# Patient Record
Sex: FEMALE | Race: WHITE | NOT HISPANIC OR LATINO | Employment: FULL TIME | URBAN - METROPOLITAN AREA
[De-identification: names, ages, dates, MRNs, and addresses within clinical notes are randomized per-mention and may not be internally consistent; named-entity substitution may affect disease eponyms.]

---

## 2017-01-03 ENCOUNTER — TELEPHONE (OUTPATIENT)
Dept: MEDICAL GROUP | Facility: CLINIC | Age: 37
End: 2017-01-03

## 2017-01-03 DIAGNOSIS — F51.02 ADJUSTMENT INSOMNIA: ICD-10-CM

## 2017-01-03 RX ORDER — TRAZODONE HYDROCHLORIDE 50 MG/1
50 TABLET ORAL NIGHTLY PRN
Qty: 60 TAB | Refills: 3 | Status: SHIPPED | OUTPATIENT
Start: 2017-01-03 | End: 2019-03-05 | Stop reason: SDUPTHER

## 2017-01-03 NOTE — TELEPHONE ENCOUNTER
VOICEMAIL  1. Caller Name: Jennifer Cano                      Call Back Number: [phone] (home)     2. Message: pt called stating her best friend passed on awilda day and she is having trouble sleeping all she thinks about is the  and just can't sleep, will like to know if you can prescribe something to help sleep. Thank you     3. Patient approves office to leave a detailed voicemail/MyChart message: yes

## 2017-01-13 ENCOUNTER — HOSPITAL ENCOUNTER (OUTPATIENT)
Dept: LAB | Facility: MEDICAL CENTER | Age: 37
End: 2017-01-13
Attending: INTERNAL MEDICINE
Payer: COMMERCIAL

## 2017-01-13 DIAGNOSIS — E78.5 DYSLIPIDEMIA: ICD-10-CM

## 2017-01-13 LAB
ALBUMIN SERPL BCP-MCNC: 4.1 G/DL (ref 3.2–4.9)
ALP SERPL-CCNC: 69 U/L (ref 30–99)
ALT SERPL-CCNC: 20 U/L (ref 2–50)
AST SERPL-CCNC: 15 U/L (ref 12–45)
BILIRUB CONJ SERPL-MCNC: <0.1 MG/DL (ref 0.1–0.5)
BILIRUB INDIRECT SERPL-MCNC: NORMAL MG/DL (ref 0–1)
BILIRUB SERPL-MCNC: 0.6 MG/DL (ref 0.1–1.5)
PROT SERPL-MCNC: 6.7 G/DL (ref 6–8.2)

## 2017-01-13 PROCEDURE — 80076 HEPATIC FUNCTION PANEL: CPT

## 2017-01-13 PROCEDURE — 36415 COLL VENOUS BLD VENIPUNCTURE: CPT

## 2017-01-25 ENCOUNTER — OFFICE VISIT (OUTPATIENT)
Dept: URGENT CARE | Facility: CLINIC | Age: 37
End: 2017-01-25
Payer: COMMERCIAL

## 2017-01-25 VITALS
RESPIRATION RATE: 16 BRPM | HEIGHT: 64 IN | BODY MASS INDEX: 36.54 KG/M2 | WEIGHT: 214 LBS | OXYGEN SATURATION: 98 % | TEMPERATURE: 98.7 F | DIASTOLIC BLOOD PRESSURE: 68 MMHG | SYSTOLIC BLOOD PRESSURE: 130 MMHG | HEART RATE: 71 BPM

## 2017-01-25 DIAGNOSIS — K64.5 THROMBOSED EXTERNAL HEMORRHOID: ICD-10-CM

## 2017-01-25 NOTE — MR AVS SNAPSHOT
"Jennifer Cano   2017 5:30 PM   Office Visit   MRN: [id number]    Department:  [facility]   Dept Phone:  [phone]    Description:  Female : 1980   Provider:  MAKENZIE Emmanuel M.D.           Reason for Visit     Hemorrhoids on anus x 4 days      Allergies as of 2017     Allergen Noted Reactions    Neomycin-Polymyxin B 2009   Rash      Vital Signs     Blood Pressure Pulse Temperature Respirations Height Weight    130/68 mmHg 71 37.1 °C (98.7 °F) 16 1.626 m (5' 4.02\") 97.07 kg (214 lb)    Body Mass Index Oxygen Saturation Breastfeeding? Smoking Status          36.72 kg/m2 98% No Never Smoker         Basic Information     Date Of Birth Sex Race Ethnicity Preferred Language    1980 Female White Non- English      Your appointments     Feb 10, 2017  3:20 PM   Established Patient with Jacob Ya D.O.   [address]  [facility] [id number] [phone]           You will be receiving a confirmation call a few days before your appointment from our automated call confirmation system.              Problem List              ICD-10-CM Priority Class Noted - Resolved    Migraines, neuralgic (Chronic) G44.009   2009 - Present    Asthma, well controlled (Chronic) J45.909   2009 - Present    Osteopenia M85.80   2016 - Present      Health Maintenance        Date Due Completion Dates    IMM DTaP/Tdap/Td Vaccine (1 - Tdap) 1999 ---    IMM PNEUMOCOCCAL 19-64 (ADULT) MEDIUM RISK SERIES (1 of 1 - PPSV23) 1999 ---    PAP SMEAR 2001 ---            Current Immunizations     Influenza TIV (IM) 10/15/2015    Influenza Vaccine Quad Inj (Pf) 10/15/2016 10:07 AM      Below and/or attached are the medications your provider expects you to take. Review all of your home medications and newly ordered medications with your provider and/or pharmacist. Follow medication instructions as directed by your provider and/or " pharmacist. Please keep your medication list with you and share with your provider. Update the information when medications are discontinued, doses are changed, or new medications (including over-the-counter products) are added; and carry medication information at all times in the event of emergency situations     Allergies:  NEOMYCIN-POLYMYXIN B - Rash               Medications  Valid as of: January 25, 2017 -  6:56 PM    Generic Name Brand Name Tablet Size Instructions for use    Butalbital-APAP-Caffeine   Take  by mouth as needed.        Glucosamine Sulfate (Cap) glucosamine Sulfate 500 MG Take 500 mg by mouth 3 times a day, with meals.        Ibuprofen (Tab) MOTRIN 600 MG Take 800 mg by mouth every 6 hours as needed.        Magnesium Gluconate (Tab) MAG-G 500 MG Take 500 mg by mouth 3 times a day.        Multiple Minerals-Vitamins (Tab) CALCIUM CITRATE PLUS  Take  by mouth.        Multiple Vitamins-Minerals   Take  by mouth.        Pravastatin Sodium (Tab) PRAVACHOL 10 MG Take 1 Tab by mouth every day.        TraZODone HCl (Tab) DESYREL 50 MG Take 1 Tab by mouth at bedtime as needed for Sleep.        .                 Medicines prescribed today were sent to:     [facility] #[id number] - [address] AT [address] [address]    Phone: [phone] Fax: [phone]    Open 24 Hours?: No      Medication refill instructions:       If your prescription bottle indicates you have medication refills left, it is not necessary to call your provider’s office. Please contact your pharmacy and they will refill your medication.    If your prescription bottle indicates you do not have any refills left, you may request refills at any time through one of the following ways: The online Nettle system (except Urgent Care), by calling your provider’s office, or by asking your pharmacy to contact your provider’s office with a refill request. Medication refills are processed only during regular  business hours and may not be available until the next business day. Your provider may request additional information or to have a follow-up visit with you prior to refilling your medication.   *Please Note: Medication refills are assigned a new Rx number when refilled electronically. Your pharmacy may indicate that no refills were authorized even though a new prescription for the same medication is available at the pharmacy. Please request the medicine by name with the pharmacy before contacting your provider for a refill.           Flavourly Access Code: [email or web address]  Expires: 2/13/2017  9:34 AM    Flavourly  A secure, online tool to manage your health information     [employer]’s Flavourly® is a secure, online tool that connects you to your personalized health information from the privacy of your home -- day or night - making it very easy for you to manage your healthcare. Once the activation process is completed, you can even access your medical information using the Flavourly tracy, which is available for free in the Apple Tracy store or Google Play store.     Flavourly provides the following levels of access (as shown below):   My Chart Features   [facility] Doctor [facility]  Specialists [facility]  Urgent  Care Non-[facility]  Primary Care  Doctor   Email your healthcare team securely and privately 24/7 X X X    Manage appointments: schedule your next appointment; view details of past/upcoming appointments X      Request prescription refills. X      View recent personal medical records, including lab and immunizations X X X X   View health record, including health history, allergies, medications X X X X   Read reports about your outpatient visits, procedures, consult and ER notes X X X X   See your discharge summary, which is a recap of your hospital and/or ER visit that includes your diagnosis, lab results, and care plan. X X       How to register for Flavourly:  1. Go to  [email or web address].  2. Click on the  Sign Up Now box, which takes you to the New Member Sign Up page. You will need to provide the following information:  a. Enter your Samba Ads Access Code exactly as it appears at the top of this page. (You will not need to use this code after you’ve completed the sign-up process. If you do not sign up before the expiration date, you must request a new code.)   b. Enter your date of birth.   c. Enter your home email address.   d. Click Submit, and follow the next screen’s instructions.  3. Create a Samba Ads ID. This will be your Samba Ads login ID and cannot be changed, so think of one that is secure and easy to remember.  4. Create a Samba Ads password. You can change your password at any time.  5. Enter your Password Reset Question and Answer. This can be used at a later time if you forget your password.   6. Enter your e-mail address. This allows you to receive e-mail notifications when new information is available in Samba Ads.  7. Click Sign Up. You can now view your health information.    For assistance activating your Samba Ads account, call [phone]

## 2017-01-27 ASSESSMENT — ENCOUNTER SYMPTOMS
NERVOUS/ANXIOUS: 0
CONSTIPATION: 0
BLOOD IN STOOL: 0
VOMITING: 0
FEVER: 0
CHILLS: 0
ABDOMINAL PAIN: 0
HEADACHES: 0
NAUSEA: 0

## 2017-01-27 NOTE — PROGRESS NOTES
"Subjective:      Jennifer Cano is a 36 y.o. female who presents with Hemorrhoids            Hemorrhoids  This is a new problem. The current episode started yesterday. The problem has been unchanged. Pertinent negatives include no abdominal pain, chest pain, chills, fever, headaches, nausea or vomiting.       Review of Systems   Constitutional: Negative for fever and chills.   Cardiovascular: Negative for chest pain.   Gastrointestinal: Positive for hemorrhoids. Negative for nausea, vomiting, abdominal pain, constipation and blood in stool.   Skin:        Mass at 6 o'clock on her rectum   Neurological: Negative for headaches.   Psychiatric/Behavioral: The patient is not nervous/anxious.           Objective:     /68 mmHg  Pulse 71  Temp(Src) 37.1 °C (98.7 °F)  Resp 16  Ht 1.626 m (5' 4.02\")  Wt 97.07 kg (214 lb)  BMI 36.72 kg/m2  SpO2 98%  Breastfeeding? No     Physical Exam   Constitutional: She appears well-developed and well-nourished. No distress.   HENT:   Head: Normocephalic and atraumatic.   Eyes: Conjunctivae are normal.   Cardiovascular: Normal rate.    Pulmonary/Chest: Effort normal.   Genitourinary:   5mm thrombosed hemorrhoid at 6 oclock   Vitals reviewed.            Procedure: I explained the surgery to the pt and she agreed, The mass was cleaned with betadine, blocked with xylocaine 1 5 without, The mass was incized with an 11 scapel and clot was removed and shown to the pt, little bleeding, polysporin and pad applied,    Assessment/Plan:     DX: Thrombosed external hemorrhoid / incized       Pt will perform sitz bathes was for infection apply ointment      "

## 2017-02-11 ENCOUNTER — HOSPITAL ENCOUNTER (OUTPATIENT)
Dept: LAB | Facility: MEDICAL CENTER | Age: 37
End: 2017-02-11
Attending: INTERNAL MEDICINE
Payer: COMMERCIAL

## 2017-05-09 ENCOUNTER — TELEPHONE (OUTPATIENT)
Dept: MEDICAL GROUP | Facility: CLINIC | Age: 37
End: 2017-05-09

## 2017-05-09 DIAGNOSIS — M72.2 PLANTAR FASCIITIS: ICD-10-CM

## 2017-05-09 NOTE — TELEPHONE ENCOUNTER
1. Caller Name: Jennifer Cano                    Call Back Number: [phone] (home)         Patient approves a detailed voicemail message: yes    2. SPECIFIC Action To Be Taken: Patient needs referral to physical therapy for foot pain (plantars factitias) in both feet     3. Diagnosis/Clinical Reason for Request: persistent foot pain     4. Specialty & Provider Name/Lab/Imaging Location: patient requesting to be seen at [facility] or [facility].    5. Is appointment scheduled for requested order/referral: no

## 2017-05-19 ENCOUNTER — OFFICE VISIT (OUTPATIENT)
Dept: MEDICAL GROUP | Facility: CLINIC | Age: 37
End: 2017-05-19
Payer: COMMERCIAL

## 2017-05-19 VITALS
TEMPERATURE: 97.8 F | RESPIRATION RATE: 16 BRPM | HEIGHT: 64 IN | HEART RATE: 84 BPM | DIASTOLIC BLOOD PRESSURE: 60 MMHG | BODY MASS INDEX: 36.19 KG/M2 | WEIGHT: 212 LBS | SYSTOLIC BLOOD PRESSURE: 118 MMHG | OXYGEN SATURATION: 96 %

## 2017-05-19 DIAGNOSIS — J01.00 ACUTE NON-RECURRENT MAXILLARY SINUSITIS: ICD-10-CM

## 2017-05-19 PROCEDURE — 99213 OFFICE O/P EST LOW 20 MIN: CPT | Performed by: NURSE PRACTITIONER

## 2017-05-19 RX ORDER — PSEUDOEPHEDRINE HCL 30 MG
60 TABLET ORAL EVERY 4 HOURS PRN
COMMUNITY
End: 2017-05-19

## 2017-05-19 RX ORDER — LORATADINE 10 MG/1
10 TABLET ORAL DAILY
COMMUNITY
End: 2018-03-06

## 2017-05-19 RX ORDER — AMOXICILLIN AND CLAVULANATE POTASSIUM 875; 125 MG/1; MG/1
1 TABLET, FILM COATED ORAL 2 TIMES DAILY
Qty: 20 TAB | Refills: 0 | Status: SHIPPED | OUTPATIENT
Start: 2017-05-19 | End: 2017-05-29

## 2017-05-19 ASSESSMENT — ENCOUNTER SYMPTOMS
COUGH: 1
SORE THROAT: 1
SHORTNESS OF BREATH: 0
WHEEZING: 0
FEVER: 0
MYALGIAS: 0
SPUTUM PRODUCTION: 1
CHILLS: 0

## 2017-05-19 ASSESSMENT — PATIENT HEALTH QUESTIONNAIRE - PHQ9: CLINICAL INTERPRETATION OF PHQ2 SCORE: 2

## 2017-05-19 NOTE — PROGRESS NOTES
Chief Complaint   Patient presents with   • URI     sinus congestion/ chest congestion/ productive cough/ ear tingling/ x 1 weeks        HISTORY OF PRESENT ILLNESS: Patient is a 37 y.o. female established patient who presents today due to a week of productive coughing, chest congestion and sinus congestion with yellowish discharge. Pt reports that she has tried several different OTC medications for decongestion and coughing. She also uses netipot to clean her nose to help her breath. She thought her symptoms are allergy related so that she has been waiting but her symptoms seem not to improve at all. She now starts feeling ear ache and worsening congestion in the past two days. Feeling cold but not chills. No fever.       Patient Active Problem List    Diagnosis Date Noted   • Osteopenia 02/08/2016   • Migraines, neuralgic 06/01/2009   • Asthma, well controlled 06/01/2009       Allergies:Neomycin-polymyxin b    Current Outpatient Prescriptions   Medication Sig Dispense Refill   • pseudoephedrine (SUDAFED) 30 MG Tab Take 60 mg by mouth every four hours as needed for Congestion.     • loratadine (CLARITIN) 10 MG Tab Take 10 mg by mouth every day.     • cholecalciferol (VITAMIN D3) 400 UNIT Tab Take 400 Units by mouth every day.     • trazodone (DESYREL) 50 MG Tab Take 1 Tab by mouth at bedtime as needed for Sleep. 60 Tab 3   • Multiple Minerals-Vitamins (CALCIUM CITRATE PLUS) Tab Take  by mouth.     • Multiple Vitamins-Minerals (MULTIVITAMIN & MINERAL PO) Take  by mouth.     • glucosamine Sulfate 500 MG Cap Take 500 mg by mouth 3 times a day, with meals.     • magnesium gluconate (MAG-G) 500 MG tablet Take 500 mg by mouth 3 times a day.     • ibuprofen (MOTRIN) 600 MG Tab Take 800 mg by mouth every 6 hours as needed.     • FIORICET PO Take  by mouth as needed.     • pravastatin (PRAVACHOL) 10 MG Tab Take 1 Tab by mouth every day. 30 Tab 11     No current facility-administered medications for this visit.       Social  "History   Substance Use Topics   • Smoking status: Never Smoker    • Smokeless tobacco: Never Used   • Alcohol Use: No       No family status information on file.     Family History   Problem Relation Age of Onset   • Hypertension Mother          ROS:  Review of Systems   Constitutional: Negative for fever and chills.   HENT: Positive for congestion and sore throat. Negative for ear pain ( itchiness and uncomfortable).    Respiratory: Positive for cough and sputum production. Negative for shortness of breath and wheezing.    Musculoskeletal: Negative for myalgias.        Objective:     Blood pressure 118/60, pulse 84, temperature 36.6 °C (97.8 °F), resp. rate 16, height 1.626 m (5' 4\"), weight 96.163 kg (212 lb), SpO2 96 %.     Physical Exam:  Physical Exam   Constitutional: She is well-developed, well-nourished, and in no distress.   HENT:   Head: Normocephalic and atraumatic.   Right Ear: Hearing, tympanic membrane and external ear normal.   Left Ear: Tympanic membrane is bulging.   Nose: Right sinus exhibits maxillary sinus tenderness. Right sinus exhibits no frontal sinus tenderness. Left sinus exhibits no maxillary sinus tenderness and no frontal sinus tenderness ( mild pressure).   Mouth/Throat: No oropharyngeal exudate, posterior oropharyngeal edema, posterior oropharyngeal erythema or tonsillar abscesses.   Vitals reviewed.        Assessment/Plan:  1. Acute non-recurrent maxillary sinusitis  - amoxicillin-clavulanate (AUGMENTIN) 875-125 MG Tab; Take 1 Tab by mouth 2 times a day for 10 days.  Dispense: 20 Tab; Refill: 0  - Chlorpheniramine-Phenylephrine (SUDAFED PE SINUS/ALLERGY) 4-10 MG Tab; Take 1 Tab by mouth every 6 hours as needed.  Dispense: 30 Tab; Refill: 0 per pt's request  - Instructed pt to read the label for other OTC medications to make sure she is not taking same medications at the same time. She verbalized understanding.     Differential diagnoses and indications for immediate follow-up " discussed with patient.    Instructed to return to clinic or nearest emergency department for any change in condition, further concerns, or worsening of symptoms.    The patient demonstrated a good understanding and agreed with the treatment plan.

## 2017-05-19 NOTE — MR AVS SNAPSHOT
"Jennifer Cano   2017 11:20 AM   Office Visit   MRN: [id number]    Department:  [facility]   Dept Phone:  [phone]    Description:  Female : 1980   Provider:  LJ Rodriguez           Reason for Visit     URI sinus congestion/ chest congestion/ productive cough/ ear tingling/ x 1 weeks       Allergies as of 2017     Allergen Noted Reactions    Neomycin-Polymyxin B 2009   Rash      You were diagnosed with     Acute non-recurrent maxillary sinusitis   [[id number]]         Vital Signs     Blood Pressure Pulse Temperature Respirations Height Weight    118/60 mmHg 84 36.6 °C (97.8 °F) 16 1.626 m (5' 4\") 96.163 kg (212 lb)    Body Mass Index Oxygen Saturation Smoking Status             36.37 kg/m2 96% Never Smoker          Basic Information     Date Of Birth Sex Race Ethnicity Preferred Language    1980 Female White Non- English      Problem List              ICD-10-CM Priority Class Noted - Resolved    Migraines, neuralgic (Chronic) G44.009   2009 - Present    Asthma, well controlled (Chronic) J45.909   2009 - Present    Osteopenia M85.80   2016 - Present      Health Maintenance        Date Due Completion Dates    IMM DTaP/Tdap/Td Vaccine (1 - Tdap) 1999 ---    IMM PNEUMOCOCCAL 19-64 (ADULT) MEDIUM RISK SERIES (1 of 1 - PPSV23) 1999 ---    PAP SMEAR 2001 ---            Current Immunizations     Influenza TIV (IM) 10/15/2015    Influenza Vaccine Quad Inj (Pf) 10/15/2016 10:07 AM      Below and/or attached are the medications your provider expects you to take. Review all of your home medications and newly ordered medications with your provider and/or pharmacist. Follow medication instructions as directed by your provider and/or pharmacist. Please keep your medication list with you and share with your provider. Update the information when medications are discontinued, doses are changed, or new medications (including over-the-counter " products) are added; and carry medication information at all times in the event of emergency situations     Allergies:  NEOMYCIN-POLYMYXIN B - Rash               Medications  Valid as of: May 19, 2017 - 12:56 PM    Generic Name Brand Name Tablet Size Instructions for use    Amoxicillin-Pot Clavulanate (Tab) AUGMENTIN 875-125 MG Take 1 Tab by mouth 2 times a day for 10 days.        Butalbital-APAP-Caffeine   Take  by mouth as needed.        Chlorpheniramine-Phenylephrine (Tab) Chlorpheniramine-Phenylephrine 4-10 MG Take 1 Tab by mouth every 6 hours as needed.        Cholecalciferol (Tab) VITAMIN D3 400 UNIT Take 400 Units by mouth every day.        Glucosamine Sulfate (Cap) glucosamine Sulfate 500 MG Take 500 mg by mouth 3 times a day, with meals.        Ibuprofen (Tab) MOTRIN 600 MG Take 800 mg by mouth every 6 hours as needed.        Loratadine (Tab) CLARITIN 10 MG Take 10 mg by mouth every day.        Magnesium Gluconate (Tab) MAG-G 500 MG Take 500 mg by mouth 3 times a day.        Multiple Minerals-Vitamins (Tab) CALCIUM CITRATE PLUS  Take  by mouth.        Multiple Vitamins-Minerals   Take  by mouth.        Pravastatin Sodium (Tab) PRAVACHOL 10 MG Take 1 Tab by mouth every day.        TraZODone HCl (Tab) DESYREL 50 MG Take 1 Tab by mouth at bedtime as needed for Sleep.        .                 Medicines prescribed today were sent to:     [id number] - [place], [place] - [address] [address]    Phone: [phone] Fax: [phone]    Open 24 Hours?: No      Medication refill instructions:       If your prescription bottle indicates you have medication refills left, it is not necessary to call your provider’s office. Please contact your pharmacy and they will refill your medication.    If your prescription bottle indicates you do not have any refills left, you may request refills at any time through one of the following ways: The online Trips n Salsa system (except Urgent Care),  by calling your provider’s office, or by asking your pharmacy to contact your provider’s office with a refill request. Medication refills are processed only during regular business hours and may not be available until the next business day. Your provider may request additional information or to have a follow-up visit with you prior to refilling your medication.   *Please Note: Medication refills are assigned a new Rx number when refilled electronically. Your pharmacy may indicate that no refills were authorized even though a new prescription for the same medication is available at the pharmacy. Please request the medicine by name with the pharmacy before contacting your provider for a refill.           Tango Card Access Code: [email or web address]  Expires: 6/18/2017  9:22 AM    Tango Card  A secure, online tool to manage your health information     [employer]’s Tango Card® is a secure, online tool that connects you to your personalized health information from the privacy of your home -- day or night - making it very easy for you to manage your healthcare. Once the activation process is completed, you can even access your medical information using the Tango Card tracy, which is available for free in the Apple Tracy store or Google Play store.     Tango Card provides the following levels of access (as shown below):   My Chart Features   [facility] Primary [facility] Doctor [facility]  Specialists [facility]  Urgent  Care Non-[facility]  Primary Care  Doctor   Email your healthcare team securely and privately 24/7 X X X    Manage appointments: schedule your next appointment; view details of past/upcoming appointments X      Request prescription refills. X      View recent personal medical records, including lab and immunizations X X X X   View health record, including health history, allergies, medications X X X X   Read reports about your outpatient visits, procedures, consult and ER notes X X X X   See your discharge summary, which is a recap of your  hospital and/or ER visit that includes your diagnosis, lab results, and care plan. X X       How to register for LocalCustomer:  1. Go to  [email or web address].  2. Click on the Sign Up Now box, which takes you to the New Member Sign Up page. You will need to provide the following information:  a. Enter your LocalCustomer Access Code exactly as it appears at the top of this page. (You will not need to use this code after you’ve completed the sign-up process. If you do not sign up before the expiration date, you must request a new code.)   b. Enter your date of birth.   c. Enter your home email address.   d. Click Submit, and follow the next screen’s instructions.  3. Create a Mobi Ridert ID. This will be your LocalCustomer login ID and cannot be changed, so think of one that is secure and easy to remember.  4. Create a Mobi Ridert password. You can change your password at any time.  5. Enter your Password Reset Question and Answer. This can be used at a later time if you forget your password.   6. Enter your e-mail address. This allows you to receive e-mail notifications when new information is available in LocalCustomer.  7. Click Sign Up. You can now view your health information.    For assistance activating your LocalCustomer account, call [phone]

## 2017-07-10 ENCOUNTER — HOSPITAL ENCOUNTER (OUTPATIENT)
Dept: RADIOLOGY | Facility: MEDICAL CENTER | Age: 37
End: 2017-07-10
Attending: OBSTETRICS & GYNECOLOGY

## 2017-07-14 ENCOUNTER — HOSPITAL ENCOUNTER (OUTPATIENT)
Dept: RADIOLOGY | Facility: MEDICAL CENTER | Age: 37
End: 2017-07-14
Attending: OBSTETRICS & GYNECOLOGY
Payer: COMMERCIAL

## 2017-07-14 DIAGNOSIS — N64.4 PAIN IN BREAST: ICD-10-CM

## 2017-07-14 PROCEDURE — 76642 ULTRASOUND BREAST LIMITED: CPT | Mod: LT

## 2017-07-14 PROCEDURE — G0204 DX MAMMO INCL CAD BI: HCPCS

## 2017-08-14 ENCOUNTER — HOSPITAL ENCOUNTER (EMERGENCY)
Facility: MEDICAL CENTER | Age: 37
End: 2017-08-14
Payer: COMMERCIAL

## 2017-08-14 VITALS
HEIGHT: 64 IN | TEMPERATURE: 97.9 F | BODY MASS INDEX: 36.89 KG/M2 | SYSTOLIC BLOOD PRESSURE: 120 MMHG | WEIGHT: 216.05 LBS | DIASTOLIC BLOOD PRESSURE: 60 MMHG | RESPIRATION RATE: 16 BRPM | HEART RATE: 90 BPM | OXYGEN SATURATION: 97 %

## 2017-08-14 PROCEDURE — 302449 STATCHG TRIAGE ONLY (STATISTIC)

## 2017-08-14 ASSESSMENT — PAIN SCALES - GENERAL: PAINLEVEL_OUTOF10: 5

## 2017-08-15 NOTE — ED NOTES
Pt amb to triage.  Chief Complaint   Patient presents with   • Headache     onset today while at work   • Neck Pain   • Dizziness

## 2017-09-16 ENCOUNTER — HOSPITAL ENCOUNTER (OUTPATIENT)
Facility: MEDICAL CENTER | Age: 37
End: 2017-09-16
Payer: COMMERCIAL

## 2017-09-16 LAB
ALBUMIN SERPL BCP-MCNC: 4.1 G/DL (ref 3.2–4.9)
ALBUMIN/GLOB SERPL: 1.3 G/DL
ALP SERPL-CCNC: 74 U/L (ref 30–99)
ALT SERPL-CCNC: 18 U/L (ref 2–50)
ANION GAP SERPL CALC-SCNC: 8 MMOL/L (ref 0–11.9)
AST SERPL-CCNC: 17 U/L (ref 12–45)
BDY FAT % MEASURED: 39.9 %
BILIRUB SERPL-MCNC: 0.8 MG/DL (ref 0.1–1.5)
BP DIAS: 72 MMHG
BP SYS: 120 MMHG
BUN SERPL-MCNC: 11 MG/DL (ref 8–22)
CALCIUM SERPL-MCNC: 9.3 MG/DL (ref 8.5–10.5)
CHLORIDE SERPL-SCNC: 106 MMOL/L (ref 96–112)
CHOLEST SERPL-MCNC: 197 MG/DL (ref 100–199)
CO2 SERPL-SCNC: 25 MMOL/L (ref 20–33)
CREAT SERPL-MCNC: 0.61 MG/DL (ref 0.5–1.4)
DIABETES HTDIA: NO
ERYTHROCYTE [DISTWIDTH] IN BLOOD BY AUTOMATED COUNT: 39.4 FL (ref 35.9–50)
EVENT NAME HTEVT: NORMAL
GFR SERPL CREATININE-BSD FRML MDRD: >60 ML/MIN/1.73 M 2
GLOBULIN SER CALC-MCNC: 3.1 G/DL (ref 1.9–3.5)
GLUCOSE SERPL-MCNC: 94 MG/DL (ref 65–99)
HCT VFR BLD AUTO: 41.7 % (ref 37–47)
HDLC SERPL-MCNC: 42 MG/DL
HGB BLD-MCNC: 13.9 G/DL (ref 12–16)
HYPERTENSION HTHYP: NO
LDLC SERPL CALC-MCNC: 137 MG/DL
MCH RBC QN AUTO: 29.7 PG (ref 27–33)
MCHC RBC AUTO-ENTMCNC: 33.3 G/DL (ref 33.6–35)
MCV RBC AUTO: 89.1 FL (ref 81.4–97.8)
PLATELET # BLD AUTO: 244 K/UL (ref 164–446)
PMV BLD AUTO: 10.1 FL (ref 9–12.9)
POTASSIUM SERPL-SCNC: 3.8 MMOL/L (ref 3.6–5.5)
PROT SERPL-MCNC: 7.2 G/DL (ref 6–8.2)
RBC # BLD AUTO: 4.68 M/UL (ref 4.2–5.4)
SCREENING LOC CITY HTCIT: NORMAL
SCREENING LOC STATE HTSTA: NORMAL
SCREENING LOCATION HTLOC: NORMAL
SODIUM SERPL-SCNC: 139 MMOL/L (ref 135–145)
SUBSCRIBER ID HTSID: NORMAL
TRIGL SERPL-MCNC: 91 MG/DL (ref 0–149)
WBC # BLD AUTO: 7.7 K/UL (ref 4.8–10.8)

## 2018-01-05 ENCOUNTER — OFFICE VISIT (OUTPATIENT)
Dept: URGENT CARE | Facility: CLINIC | Age: 38
End: 2018-01-05
Payer: COMMERCIAL

## 2018-01-05 VITALS
RESPIRATION RATE: 16 BRPM | HEART RATE: 98 BPM | WEIGHT: 212 LBS | SYSTOLIC BLOOD PRESSURE: 124 MMHG | HEIGHT: 64 IN | OXYGEN SATURATION: 98 % | BODY MASS INDEX: 36.19 KG/M2 | DIASTOLIC BLOOD PRESSURE: 82 MMHG | TEMPERATURE: 97.8 F

## 2018-01-05 DIAGNOSIS — J01.40 ACUTE NON-RECURRENT PANSINUSITIS: ICD-10-CM

## 2018-01-05 DIAGNOSIS — E66.9 OBESITY (BMI 35.0-39.9 WITHOUT COMORBIDITY): ICD-10-CM

## 2018-01-05 DIAGNOSIS — H66.003 ACUTE SUPPURATIVE OTITIS MEDIA OF BOTH EARS WITHOUT SPONTANEOUS RUPTURE OF TYMPANIC MEMBRANES, RECURRENCE NOT SPECIFIED: Primary | ICD-10-CM

## 2018-01-05 DIAGNOSIS — J06.9 URI WITH COUGH AND CONGESTION: ICD-10-CM

## 2018-01-05 DIAGNOSIS — J40 BRONCHITIS: ICD-10-CM

## 2018-01-05 PROCEDURE — 99214 OFFICE O/P EST MOD 30 MIN: CPT | Performed by: PHYSICIAN ASSISTANT

## 2018-01-05 RX ORDER — METHYLPREDNISOLONE 4 MG/1
TABLET ORAL
Qty: 21 TAB | Refills: 0 | Status: SHIPPED | OUTPATIENT
Start: 2018-01-05 | End: 2018-03-06

## 2018-01-05 RX ORDER — AMOXICILLIN AND CLAVULANATE POTASSIUM 875; 125 MG/1; MG/1
1 TABLET, FILM COATED ORAL 2 TIMES DAILY
Qty: 20 TAB | Refills: 0 | Status: SHIPPED | OUTPATIENT
Start: 2018-01-05 | End: 2018-01-15

## 2018-01-05 RX ORDER — PROMETHAZINE HYDROCHLORIDE AND CODEINE PHOSPHATE 6.25; 1 MG/5ML; MG/5ML
5 SYRUP ORAL 4 TIMES DAILY PRN
Qty: 240 ML | Refills: 0 | Status: SHIPPED | OUTPATIENT
Start: 2018-01-05 | End: 2018-01-19

## 2018-01-06 PROBLEM — E66.9 OBESITY (BMI 35.0-39.9 WITHOUT COMORBIDITY): Status: ACTIVE | Noted: 2018-01-06

## 2018-01-06 NOTE — PROGRESS NOTES
Subjective:      Pt is a 37 y.o. female who presents with Sinus Problem (cough, congestion X 2 weeks )            HPI  PT presents to  clinic today complaining of sore throat, pressure in ears, cough, fatigue, runny nose, post nasal drip, sinus pressure and headache. PT denies CP, SOB, NVD, abdominal pain, joint pain. PT states these symptoms began around 2 weeks ago. PT states the pain is a 5/10 with sinus pressure, aching in nature and worse at night.  Pt has not taken any RX medications for this condition. The pt's medication list, problem list, and allergies have been evaluated and reviewed during today's visit.    PMH:  Past Medical History:   Diagnosis Date   • ASTHMA 6/1/2009   • Migraines, neuralgic 6/1/2009   • Osteopenia 2/8/2016       PSH:  Past Surgical History:   Procedure Laterality Date   • PRIMARY C SECTION         Fam Hx:    family history includes Hypertension in her mother.  Family Status   Relation Status   • Mother        Soc HX:  Social History     Social History   • Marital status:      Spouse name: N/A   • Number of children: N/A   • Years of education: N/A     Occupational History   • Not on file.     Social History Main Topics   • Smoking status: Never Smoker   • Smokeless tobacco: Never Used   • Alcohol use Yes   • Drug use:      Types: Marijuana      Comment: prn   • Sexual activity: Not Currently     Other Topics Concern   • Not on file     Social History Narrative   • No narrative on file         Medications:    Current Outpatient Prescriptions:   •  amoxicillin-clavulanate (AUGMENTIN) 875-125 MG Tab, Take 1 Tab by mouth 2 times a day for 10 days., Disp: 20 Tab, Rfl: 0  •  promethazine-codeine (PHENERGAN-CODEINE) 6.25-10 MG/5ML Syrup, Take 5 mL by mouth 4 times a day as needed for up to 14 days., Disp: 240 mL, Rfl: 0  •  MethylPREDNISolone (MEDROL DOSEPAK) 4 MG Tablet Therapy Pack, Use as directed, Disp: 21 Tab, Rfl: 0  •  Multiple Vitamins-Minerals (MULTIVITAMIN & MINERAL PO),  Take  by mouth., Disp: , Rfl:   •  loratadine (CLARITIN) 10 MG Tab, Take 10 mg by mouth every day., Disp: , Rfl:   •  cholecalciferol (VITAMIN D3) 400 UNIT Tab, Take 400 Units by mouth every day., Disp: , Rfl:   •  Chlorpheniramine-Phenylephrine (SUDAFED PE SINUS/ALLERGY) 4-10 MG Tab, Take 1 Tab by mouth every 6 hours as needed., Disp: 30 Tab, Rfl: 0  •  trazodone (DESYREL) 50 MG Tab, Take 1 Tab by mouth at bedtime as needed for Sleep., Disp: 60 Tab, Rfl: 3  •  pravastatin (PRAVACHOL) 10 MG Tab, Take 1 Tab by mouth every day., Disp: 30 Tab, Rfl: 11  •  Multiple Minerals-Vitamins (CALCIUM CITRATE PLUS) Tab, Take  by mouth., Disp: , Rfl:   •  glucosamine Sulfate 500 MG Cap, Take 500 mg by mouth 3 times a day, with meals., Disp: , Rfl:   •  magnesium gluconate (MAG-G) 500 MG tablet, Take 500 mg by mouth 3 times a day., Disp: , Rfl:   •  ibuprofen (MOTRIN) 600 MG Tab, Take 800 mg by mouth every 6 hours as needed., Disp: , Rfl:   •  FIORICET PO, Take  by mouth as needed., Disp: , Rfl:       Allergies:  Neomycin-polymyxin b    ROS  Review of Systems   Constitutional: Positive for malaise/fatigue. Negative for fever and diaphoresis.   HENT: Positive for congestion, B/L ear pain and sore throat. Negative for ear discharge, hearing loss, nosebleeds and tinnitus.    Eyes: Negative for blurred vision, double vision and photophobia.   Respiratory: Positive for cough, sputum production, shortness of breath and wheezing. Negative for hemoptysis.    Cardiovascular: Negative for chest pain and palpitations.   Gastrointestinal: Negative for nausea, vomiting, abdominal pain, diarrhea and constipation.   Genitourinary: Negative for dysuria and flank pain.   Musculoskeletal: Negative for joint pain and myalgias.   Skin: Negative for itching and rash.   Neurological: Positive for sinus headaches. Negative for dizziness, tingling and weakness.   Endo/Heme/Allergies: Does not bruise/bleed easily.   Psychiatric/Behavioral: Negative for  "depression. The patient is not nervous/anxious.             Objective:     /82   Pulse 98   Temp 36.6 °C (97.8 °F)   Resp 16   Ht 1.626 m (5' 4\")   Wt 96.2 kg (212 lb)   SpO2 98%   BMI 36.39 kg/m²      Physical Exam       Physical Exam   Constitutional: PT is oriented to person, place, and time. PT appears well-developed and well-nourished. No distress.   HENT:   Head: Normocephalic and atraumatic.   Right Ear: Hearing, external ear and ear canal normal. Tympanic membrane is erythematous and bulging. A middle ear effusion is present.   Left Ear: Hearing, external ear and ear canal normal. Tympanic membrane is erythematous and bulging. A middle ear effusion is present.   Nose: Mucosal edema, rhinorrhea and sinus tenderness present. Right sinus exhibits frontal sinus tenderness. Left sinus exhibits frontal sinus tenderness.   Mouth/Throat: Uvula is midline. Mucous membranes are pale. Posterior oropharyngeal edema and posterior oropharyngeal erythema present. No oropharyngeal exudate.   Eyes: Conjunctivae normal and EOM are normal. Pupils are equal, round, and reactive to light. Right eye exhibits no discharge. Left eye exhibits no discharge.   Neck: Normal range of motion. Neck supple. No thyromegaly present.   Cardiovascular: Normal rate, regular rhythm, normal heart sounds and intact distal pulses.  Exam reveals no gallop and no friction rub.    No murmur heard.  Pulmonary/Chest: Effort normal. No respiratory distress. PT has wheezes. PT has no rales. PT exhibits tenderness.   Abdominal: Soft. Bowel sounds are normal. PT exhibits no distension and no mass. There is no tenderness. There is no rebound and no guarding.   Musculoskeletal: Normal range of motion. PT exhibits no edema and no tenderness.   Lymphadenopathy:     PT has no cervical adenopathy.   Neurological: Pt is alert and oriented to person, place, and time. Pt has normal reflexes. No cranial nerve deficit.   Skin: Skin is warm and dry. No " rash noted. No erythema.   Psychiatric: PT has a normal mood and affect. Pt behavior is normal. Judgment and thought content normal.        Assessment/Plan:     1. Acute suppurative otitis media of both ears without spontaneous rupture of tympanic membranes, recurrence not specified    - amoxicillin-clavulanate (AUGMENTIN) 875-125 MG Tab; Take 1 Tab by mouth 2 times a day for 10 days.  Dispense: 20 Tab; Refill: 0  - MethylPREDNISolone (MEDROL DOSEPAK) 4 MG Tablet Therapy Pack; Use as directed  Dispense: 21 Tab; Refill: 0    2. Acute non-recurrent pansinusitis    - amoxicillin-clavulanate (AUGMENTIN) 875-125 MG Tab; Take 1 Tab by mouth 2 times a day for 10 days.  Dispense: 20 Tab; Refill: 0  - MethylPREDNISolone (MEDROL DOSEPAK) 4 MG Tablet Therapy Pack; Use as directed  Dispense: 21 Tab; Refill: 0    3. Bronchitis    - amoxicillin-clavulanate (AUGMENTIN) 875-125 MG Tab; Take 1 Tab by mouth 2 times a day for 10 days.  Dispense: 20 Tab; Refill: 0  - MethylPREDNISolone (MEDROL DOSEPAK) 4 MG Tablet Therapy Pack; Use as directed  Dispense: 21 Tab; Refill: 0    4. URI with cough and congestion    - promethazine-codeine (PHENERGAN-CODEINE) 6.25-10 MG/5ML Syrup; Take 5 mL by mouth 4 times a day as needed for up to 14 days.  Dispense: 240 mL; Refill: 0  - MethylPREDNISolone (MEDROL DOSEPAK) 4 MG Tablet Therapy Pack; Use as directed  Dispense: 21 Tab; Refill: 0    5. Obesity (BMI 35.0-39.9 without comorbidity)  Based on the electronic medical record calculations for BMI: the pt was diagnosed with obesity. Obesity counseling discussed concerning diet and exercise improvements. Pt was in full understanding with information given and plan set forth.    Rest, fluids encouraged.  OTC decongestant for congestion/cough  Note given for work.  AVS with medical info given.  Pt was in full understanding and agreement with the plan.  Follow-up as needed if symptoms worsen or fail to improve.

## 2018-02-24 ENCOUNTER — HOSPITAL ENCOUNTER (OUTPATIENT)
Dept: LAB | Facility: MEDICAL CENTER | Age: 38
End: 2018-02-24
Attending: FAMILY MEDICINE
Payer: COMMERCIAL

## 2018-02-24 LAB
CHOLEST SERPL-MCNC: 187 MG/DL (ref 100–199)
EST. AVERAGE GLUCOSE BLD GHB EST-MCNC: 114 MG/DL
GLUCOSE SERPL-MCNC: 90 MG/DL (ref 65–99)
HBA1C MFR BLD: 5.6 % (ref 0–5.6)
HDLC SERPL-MCNC: 40 MG/DL
LDLC SERPL CALC-MCNC: 128 MG/DL
TRIGL SERPL-MCNC: 95 MG/DL (ref 0–149)

## 2018-02-26 LAB — LPA SERPL-MCNC: 47 MG/DL

## 2018-02-28 LAB
CHOLEST SERPL-MCNC: 197 MG/DL
HDL PARTICAL NO Q4363: 33.5 UMOL/L
HDL SIZE Q4361: 8.5 NM
HDLC SERPL-MCNC: 43 MG/DL (ref 40–59)
HLD.LARGE SERPL-SCNC: <2.8 UMOL/L
L VLDL PART NO Q4357: 3.8 NMOL/L
LDL SERPL QN: 21 NM
LDL SERPL-SCNC: 1572 NMOL/L
LDL SMALL SERPL-SCNC: 753 NMOL/L
LDLC SERPL CALC-MCNC: 134 MG/DL
PATHOLOGY STUDY: ABNORMAL
TRIGL SERPL-MCNC: 98 MG/DL (ref 30–149)
VLDL SIZE Q4362: 49.1 NM

## 2018-03-02 ENCOUNTER — HOSPITAL ENCOUNTER (OUTPATIENT)
Dept: LAB | Facility: MEDICAL CENTER | Age: 38
End: 2018-03-02
Attending: INTERNAL MEDICINE
Payer: COMMERCIAL

## 2018-03-02 ENCOUNTER — OFFICE VISIT (OUTPATIENT)
Dept: MEDICAL GROUP | Facility: CLINIC | Age: 38
End: 2018-03-02
Payer: COMMERCIAL

## 2018-03-02 VITALS
SYSTOLIC BLOOD PRESSURE: 108 MMHG | DIASTOLIC BLOOD PRESSURE: 60 MMHG | TEMPERATURE: 97.7 F | HEIGHT: 64 IN | HEART RATE: 95 BPM | OXYGEN SATURATION: 95 % | WEIGHT: 216 LBS | BODY MASS INDEX: 36.88 KG/M2 | RESPIRATION RATE: 16 BRPM

## 2018-03-02 DIAGNOSIS — I88.9 CERVICAL LYMPHADENITIS: ICD-10-CM

## 2018-03-02 LAB
BASOPHILS # BLD AUTO: 0.5 % (ref 0–1.8)
BASOPHILS # BLD: 0.04 K/UL (ref 0–0.12)
EOSINOPHIL # BLD AUTO: 0.1 K/UL (ref 0–0.51)
EOSINOPHIL NFR BLD: 1.3 % (ref 0–6.9)
ERYTHROCYTE [DISTWIDTH] IN BLOOD BY AUTOMATED COUNT: 41.7 FL (ref 35.9–50)
HCT VFR BLD AUTO: 44.2 % (ref 37–47)
HETEROPH AB SER QL LA: NEGATIVE
HGB BLD-MCNC: 14.7 G/DL (ref 12–16)
IMM GRANULOCYTES # BLD AUTO: 0.02 K/UL (ref 0–0.11)
IMM GRANULOCYTES NFR BLD AUTO: 0.3 % (ref 0–0.9)
INT CON NEG: NORMAL
INT CON POS: NORMAL
LYMPHOCYTES # BLD AUTO: 2.9 K/UL (ref 1–4.8)
LYMPHOCYTES NFR BLD: 36.5 % (ref 22–41)
MCH RBC QN AUTO: 30 PG (ref 27–33)
MCHC RBC AUTO-ENTMCNC: 33.3 G/DL (ref 33.6–35)
MCV RBC AUTO: 90.2 FL (ref 81.4–97.8)
MONOCYTES # BLD AUTO: 0.52 K/UL (ref 0–0.85)
MONOCYTES NFR BLD AUTO: 6.5 % (ref 0–13.4)
NEUTROPHILS # BLD AUTO: 4.36 K/UL (ref 2–7.15)
NEUTROPHILS NFR BLD: 54.9 % (ref 44–72)
NRBC # BLD AUTO: 0 K/UL
NRBC BLD-RTO: 0 /100 WBC
PLATELET # BLD AUTO: 221 K/UL (ref 164–446)
PMV BLD AUTO: 10.3 FL (ref 9–12.9)
RBC # BLD AUTO: 4.9 M/UL (ref 4.2–5.4)
T4 FREE SERPL-MCNC: 0.69 NG/DL (ref 0.53–1.43)
TSH SERPL DL<=0.005 MIU/L-ACNC: 2.34 UIU/ML (ref 0.38–5.33)
WBC # BLD AUTO: 7.9 K/UL (ref 4.8–10.8)

## 2018-03-02 PROCEDURE — 84439 ASSAY OF FREE THYROXINE: CPT

## 2018-03-02 PROCEDURE — 85025 COMPLETE CBC W/AUTO DIFF WBC: CPT

## 2018-03-02 PROCEDURE — 84443 ASSAY THYROID STIM HORMONE: CPT

## 2018-03-02 PROCEDURE — 99213 OFFICE O/P EST LOW 20 MIN: CPT | Performed by: INTERNAL MEDICINE

## 2018-03-02 PROCEDURE — 86308 HETEROPHILE ANTIBODY SCREEN: CPT | Performed by: INTERNAL MEDICINE

## 2018-03-02 PROCEDURE — 36415 COLL VENOUS BLD VENIPUNCTURE: CPT

## 2018-03-02 RX ORDER — AZITHROMYCIN 250 MG/1
TABLET, FILM COATED ORAL
Qty: 6 TAB | Refills: 0 | Status: SHIPPED | OUTPATIENT
Start: 2018-03-02 | End: 2018-03-06

## 2018-03-02 NOTE — PROGRESS NOTES
CC: Jennifer Cano is a 37 y.o. female is suffering from   Chief Complaint   Patient presents with   • Oral Pain     possible swollen lymph node per dentist x 1 month         SUBJECTIVE:  1. Cervical lymphadenitis  Patient's here for follow-up has problems with the cervical lymph node that is swollen, we discussed various issues including possible exposure to [employer] bar or having problems with mononucleosis. Monospot test was done in the office and is negative.        Past social, family, history:   Social History   Substance Use Topics   • Smoking status: Never Smoker   • Smokeless tobacco: Never Used   • Alcohol use Yes         MEDICATIONS:    Current Outpatient Prescriptions:   •  azithromycin (ZITHROMAX) 250 MG Tab, Two day one then qd x 4., Disp: 6 Tab, Rfl: 0  •  Multiple Vitamins-Minerals (MULTIVITAMIN & MINERAL PO), Take  by mouth., Disp: , Rfl:   •  MethylPREDNISolone (MEDROL DOSEPAK) 4 MG Tablet Therapy Pack, Use as directed, Disp: 21 Tab, Rfl: 0  •  loratadine (CLARITIN) 10 MG Tab, Take 10 mg by mouth every day., Disp: , Rfl:   •  cholecalciferol (VITAMIN D3) 400 UNIT Tab, Take 400 Units by mouth every day., Disp: , Rfl:   •  Chlorpheniramine-Phenylephrine (SUDAFED PE SINUS/ALLERGY) 4-10 MG Tab, Take 1 Tab by mouth every 6 hours as needed., Disp: 30 Tab, Rfl: 0  •  trazodone (DESYREL) 50 MG Tab, Take 1 Tab by mouth at bedtime as needed for Sleep., Disp: 60 Tab, Rfl: 3  •  pravastatin (PRAVACHOL) 10 MG Tab, Take 1 Tab by mouth every day., Disp: 30 Tab, Rfl: 11  •  Multiple Minerals-Vitamins (CALCIUM CITRATE PLUS) Tab, Take  by mouth., Disp: , Rfl:   •  glucosamine Sulfate 500 MG Cap, Take 500 mg by mouth 3 times a day, with meals., Disp: , Rfl:   •  magnesium gluconate (MAG-G) 500 MG tablet, Take 500 mg by mouth 3 times a day., Disp: , Rfl:   •  ibuprofen (MOTRIN) 600 MG Tab, Take 800 mg by mouth every 6 hours as needed., Disp: , Rfl:   •  FIORICET PO, Take  by mouth as needed., Disp: ,  "Rfl:     PROBLEMS:  Patient Active Problem List    Diagnosis Date Noted   • Obesity (BMI 35.0-39.9 without comorbidity) ([facility]) 01/06/2018   • Osteopenia 02/08/2016   • Migraines, neuralgic 06/01/2009   • Asthma, well controlled 06/01/2009       REVIEW OF SYSTEMS:  Gen.:  No Nausea, Vomiting, fever, Chills.  Heart: No chest pain.  Lungs:  No shortness of Breath.  Psychological: Sudhakar unusual Anxiety depression     PHYSICAL EXAM   Constitutional: Alert, cooperative, not in acute distress.  Cardiovascular:  Rate Rhythm is regular without murmurs rubs clicks.     Thorax & Lungs: Clear to auscultation, no wheezing, rhonchi, or rales  HENT: Normocephalic, Atraumatic.  Eyes: PERRLA, EOMI, Conjunctiva normal.   Neck: Trachia is midline no swelling of the thyroid.   Lymphatic: Right submandibular lymph node.   Abdomin: Soft non-tender, no rebound, no guarding.   Neurologic: Alert & oriented x 3, cranial nerves II through XII are intact, Normal motor function, Normal sensory function, No focal deficits noted.   Psychiatric: Affect normal, Judgment normal, Mood normal.     VITAL SIGNS:/60   Pulse 95   Temp 36.5 °C (97.7 °F)   Resp 16   Ht 1.626 m (5' 4\")   Wt 98 kg (216 lb)   SpO2 95%   BMI 37.08 kg/m²     Labs: Reviewed    Assessment:                                                     Plan:    1. Cervical lymphadenitis  Referral to ENT ultrasound ordered of the soft tissues of the neck labs ordered patients to start azithromycin to see if this is effective in helping with the pain.  - REFERRAL TO ENT  - MONONUCLEOSIS TEST QUAL; Future  - POCT Mononucleosis (mono)  - CBC WITH DIFFERENTIAL; Future  - US-SOFT TISSUES OF HEAD - NECK; Future  - azithromycin (ZITHROMAX) 250 MG Tab; Two day one then qd x 4.  Dispense: 6 Tab; Refill: 0  - COMP METABOLIC PANEL; Future  - TSH; Future  - FREE THYROXINE; Future        "

## 2018-03-06 ENCOUNTER — OFFICE VISIT (OUTPATIENT)
Dept: MEDICAL GROUP | Facility: CLINIC | Age: 38
End: 2018-03-06
Payer: COMMERCIAL

## 2018-03-06 VITALS
SYSTOLIC BLOOD PRESSURE: 110 MMHG | RESPIRATION RATE: 16 BRPM | DIASTOLIC BLOOD PRESSURE: 64 MMHG | OXYGEN SATURATION: 98 % | TEMPERATURE: 97.7 F | HEART RATE: 72 BPM | BODY MASS INDEX: 37.05 KG/M2 | HEIGHT: 64 IN | WEIGHT: 217 LBS

## 2018-03-06 DIAGNOSIS — G44.029 CHRONIC CLUSTER HEADACHE, NOT INTRACTABLE: Chronic | ICD-10-CM

## 2018-03-06 DIAGNOSIS — E66.9 OBESITY (BMI 35.0-39.9 WITHOUT COMORBIDITY): ICD-10-CM

## 2018-03-06 DIAGNOSIS — J45.20 ASTHMA, MILD INTERMITTENT, WELL-CONTROLLED: Chronic | ICD-10-CM

## 2018-03-06 DIAGNOSIS — Z00.00 ANNUAL PHYSICAL EXAM: ICD-10-CM

## 2018-03-06 DIAGNOSIS — E78.5 DYSLIPIDEMIA: ICD-10-CM

## 2018-03-06 PROCEDURE — 99395 PREV VISIT EST AGE 18-39: CPT | Performed by: INTERNAL MEDICINE

## 2018-03-06 RX ORDER — PRAVASTATIN SODIUM 10 MG
10 TABLET ORAL DAILY
Qty: 90 TAB | Refills: 3 | Status: SHIPPED | OUTPATIENT
Start: 2018-03-06 | End: 2019-10-25

## 2018-03-07 NOTE — PROGRESS NOTES
CC: Jennifer Cano is a 37 y.o. female is suffering from   Chief Complaint   Patient presents with   • Annual Exam   • Results     imaging and blood tests        SUBJECTIVE:  1. Annual physical exam  Patient's here for an annual physical examination, appears be doing really quite well states that her previously treated upper respiratory tract infection is completely resolved lymph nodes have decreased in size without the use of antibiotics. Of explain am very happy with that    2. Dyslipidemia  Patient has a history of dyslipidemia I'm very concerned about her cholesterol of recommended that she start low-dose Pravachol    3. Obesity (BMI 35.0-39.9 without comorbidity) (HCC)  Patient is suffering from obesity, we've discussed importance for diet exercise    4. Asthma, mild intermittent, well-controlled  Patient with very mild asthma which is well controlled no change in medical therapy    5. Chronic cluster headache, not intractable  Patient will cluster headaches associated with drinking citrus juice. Patient appears to be stable        Past social, family, history: Recently  2 children  Social History   Substance Use Topics   • Smoking status: Never Smoker   • Smokeless tobacco: Never Used   • Alcohol use Yes         MEDICATIONS:    Current Outpatient Prescriptions:   •  pravastatin (PRAVACHOL) 10 MG Tab, Take 1 Tab by mouth every day., Disp: 90 Tab, Rfl: 3  •  Multiple Vitamins-Minerals (MULTIVITAMIN & MINERAL PO), Take  by mouth., Disp: , Rfl:   •  cholecalciferol (VITAMIN D3) 400 UNIT Tab, Take 400 Units by mouth every day., Disp: , Rfl:   •  Chlorpheniramine-Phenylephrine (SUDAFED PE SINUS/ALLERGY) 4-10 MG Tab, Take 1 Tab by mouth every 6 hours as needed., Disp: 30 Tab, Rfl: 0  •  trazodone (DESYREL) 50 MG Tab, Take 1 Tab by mouth at bedtime as needed for Sleep., Disp: 60 Tab, Rfl: 3  •  Multiple Minerals-Vitamins (CALCIUM CITRATE PLUS) Tab, Take  by mouth., Disp: , Rfl:   •  glucosamine  Sulfate 500 MG Cap, Take 500 mg by mouth 3 times a day, with meals., Disp: , Rfl:   •  magnesium gluconate (MAG-G) 500 MG tablet, Take 500 mg by mouth 3 times a day., Disp: , Rfl:   •  ibuprofen (MOTRIN) 600 MG Tab, Take 800 mg by mouth every 6 hours as needed., Disp: , Rfl:   •  FIORICET PO, Take  by mouth as needed., Disp: , Rfl:     PROBLEMS:  Patient Active Problem List    Diagnosis Date Noted   • Obesity (BMI 35.0-39.9 without comorbidity) ([facility]) 01/06/2018   • Osteopenia 02/08/2016   • Migraines, neuralgic 06/01/2009   • Asthma, well controlled 06/01/2009       REVIEW OF SYSTEMS:  General: Patient denies any problems with nausea vomiting fever chills chest pain or tightness.  Head:  No history of strokes seizures severe head trauma or loss of consciousness.   HEENT: No history of any significant vision loss, hearing loss, changes in sense of smell hoarseness  Heart: No chest pain tightness squeezing.   Lungs: No recurrent asthma bronchitis pneumonia.   Gastrointestinal: No history of black or bloody stools or  Constipation colonoscopy was done.  Genitourinary:  No increased frequency urgency pain and burning with urination  Musculoskeletal: No joint pain or discomfort.   Skin: No skin changes      PHYSICAL EXAM   Constitutional: Alert, cooperative, not in acute distress.  Cardiovascular:  Rate Rhythm is regular without murmurs rubs clicks.     Thorax & Lungs: Clear to auscultation, no wheezing, rhonchi, or rales  HENT: Normocephalic, Atraumatic.  Eyes: PERRLA, EOMI, Conjunctiva normal.   Neck: Trachia is midline no swelling of the thyroid.   Lymphatic: No lymphadenopathy noted.   Abdomin: Soft non-tender, no rebound, no guarding.   Skin: Warm, Dry, No erythema, No rash.   Extremities: Atraumatic with symmetric distal pulses, No edema, No tenderness, No cyanosis, No clubbing.   Neurologic: Alert & oriented x 3, cranial nerves II through XII are intact, Normal motor function, Normal sensory function, No focal  "deficits noted.   Psychiatric: Affect normal, Judgment normal, Mood normal.     VITAL SIGNS:/64   Pulse 72   Temp 36.5 °C (97.7 °F)   Resp 16   Ht 1.626 m (5' 4\")   Wt 98.4 kg (217 lb)   SpO2 98%   BMI 37.25 kg/m²     Labs: Reviewed    Assessment:                                                     Plan:    1. Annual physical exam  Patient appears to be in good physical condition with the exception of her cholesterol and her weight    2. Dyslipidemia  I have discussed with the patient starting Pravachol low-dose she's to stop this medication should it cause any symptoms  - pravastatin (PRAVACHOL) 10 MG Tab; Take 1 Tab by mouth every day.  Dispense: 90 Tab; Refill: 3  - HEPATIC FUNCTION PANEL; Future  - LIPID PROFILE; Future  - COMP METABOLIC PANEL; Future    3. Obesity (BMI 35.0-39.9 without comorbidity) (HCC)  Patient with obesity. Discussed the importance of her diet and her exercise patient admits to stress eating she is an  in middle school    4. Asthma, mild intermittent, well-controlled  Patient with a history of asthma clinically stable    5. Chronic cluster headache, not intractable  Patient will cluster headaches associated with her drinking citrus juice this is significantly improved with avoidance of citrus.        "

## 2018-03-12 ENCOUNTER — APPOINTMENT (OUTPATIENT)
Dept: RADIOLOGY | Facility: MEDICAL CENTER | Age: 38
End: 2018-03-12
Attending: INTERNAL MEDICINE
Payer: COMMERCIAL

## 2018-10-09 ENCOUNTER — OFFICE VISIT (OUTPATIENT)
Dept: URGENT CARE | Facility: CLINIC | Age: 38
End: 2018-10-09
Payer: COMMERCIAL

## 2018-10-09 VITALS
WEIGHT: 217 LBS | HEIGHT: 64 IN | TEMPERATURE: 98.9 F | HEART RATE: 100 BPM | BODY MASS INDEX: 37.05 KG/M2 | SYSTOLIC BLOOD PRESSURE: 110 MMHG | OXYGEN SATURATION: 95 % | DIASTOLIC BLOOD PRESSURE: 88 MMHG | RESPIRATION RATE: 16 BRPM

## 2018-10-09 DIAGNOSIS — J02.0 STREP PHARYNGITIS: ICD-10-CM

## 2018-10-09 LAB
INT CON NEG: NORMAL
INT CON POS: NORMAL
S PYO AG THROAT QL: POSITIVE

## 2018-10-09 PROCEDURE — 99214 OFFICE O/P EST MOD 30 MIN: CPT | Performed by: PHYSICIAN ASSISTANT

## 2018-10-09 PROCEDURE — 87880 STREP A ASSAY W/OPTIC: CPT | Performed by: PHYSICIAN ASSISTANT

## 2018-10-09 RX ORDER — AMOXICILLIN 875 MG/1
875 TABLET, COATED ORAL 2 TIMES DAILY
Qty: 20 TAB | Refills: 0 | Status: SHIPPED | OUTPATIENT
Start: 2018-10-09 | End: 2018-10-09 | Stop reason: SDUPTHER

## 2018-10-09 RX ORDER — AMOXICILLIN 875 MG/1
875 TABLET, COATED ORAL 2 TIMES DAILY
Qty: 20 TAB | Refills: 0 | Status: SHIPPED | OUTPATIENT
Start: 2018-10-09 | End: 2018-10-19

## 2018-10-09 ASSESSMENT — ENCOUNTER SYMPTOMS
NECK PAIN: 1
VOMITING: 0
NAUSEA: 0
HEADACHES: 1
TINGLING: 0
MYALGIAS: 1
SORE THROAT: 1
SPUTUM PRODUCTION: 0
SWOLLEN GLANDS: 0
DIARRHEA: 0
SHORTNESS OF BREATH: 0
ABDOMINAL PAIN: 0
CHILLS: 0
SENSORY CHANGE: 0
FOCAL WEAKNESS: 0
FEVER: 0
PALPITATIONS: 0
SINUS PAIN: 0
COUGH: 0
WHEEZING: 0

## 2018-10-10 NOTE — PROGRESS NOTES
"Subjective:      Jennifer Cano is a 38 y.o. female who presents with Pharyngitis (son has strep, x 2 days  white pustules on throat)            Pharyngitis    This is a new problem. Episode onset: 2 days  The problem has been unchanged. There has been no fever. Associated symptoms include congestion, headaches and neck pain. Pertinent negatives include no abdominal pain, coughing, diarrhea, ear pain, shortness of breath, swollen glands or vomiting. She has had exposure to strep (Son has strep ). She has tried nothing for the symptoms.         Past Medical History:   Diagnosis Date   • ASTHMA 6/1/2009   • Migraines, neuralgic 6/1/2009   • Osteopenia 2/8/2016       Past Surgical History:   Procedure Laterality Date   • PRIMARY C SECTION         Family History   Problem Relation Age of Onset   • Hypertension Mother        Allergies   Allergen Reactions   • Neomycin-Polymyxin B Rash       Medications, Allergies, and current problem list reviewed today in Epic    Review of Systems   Constitutional: Positive for malaise/fatigue. Negative for chills and fever.   HENT: Positive for congestion and sore throat. Negative for ear pain and sinus pain.    Respiratory: Negative for cough, sputum production, shortness of breath and wheezing.    Cardiovascular: Negative for chest pain, palpitations and leg swelling.   Gastrointestinal: Negative for abdominal pain, diarrhea, nausea and vomiting.   Musculoskeletal: Positive for myalgias and neck pain.   Skin: Negative for rash.   Neurological: Positive for headaches. Negative for tingling, sensory change and focal weakness.     All other systems reviewed and are negative.        Objective:     /88   Pulse 100   Temp 37.2 °C (98.9 °F)   Resp 16   Ht 1.626 m (5' 4\")   Wt 98.4 kg (217 lb)   SpO2 95%   BMI 37.25 kg/m²      Physical Exam   Constitutional: She is oriented to person, place, and time. She appears well-developed and well-nourished. No distress.   HENT: "   Head: Normocephalic and atraumatic.   Right Ear: Tympanic membrane, external ear and ear canal normal.   Left Ear: Tympanic membrane, external ear and ear canal normal.   Nose: Nose normal.   Mouth/Throat: Uvula is midline and mucous membranes are normal. Posterior oropharyngeal erythema present. Tonsils are 1+ on the right. Tonsils are 1+ on the left. Tonsillar exudate.   Eyes: Conjunctivae are normal.   Neck: Neck supple.   Cardiovascular: Normal rate, regular rhythm and normal heart sounds.  Exam reveals no gallop and no friction rub.    No murmur heard.  Pulmonary/Chest: Effort normal and breath sounds normal. No respiratory distress. She has no wheezes. She has no rales.   Lymphadenopathy:     She has cervical adenopathy (moderate anterior cervical adenopathy bilaterally ).   Neurological: She is alert and oriented to person, place, and time. No cranial nerve deficit.   Skin: Skin is warm and dry. No rash noted.   Psychiatric: She has a normal mood and affect. Her behavior is normal. Judgment and thought content normal.               Assessment/Plan:     1. Strep pharyngitis  amoxicillin (AMOXIL) 875 MG tablet         Poct strep- positive       Current Outpatient Prescriptions:   •  amoxicillin (AMOXIL) 875 MG tablet, Take 1 Tab by mouth 2 times a day for 10 days., Disp: 20 Tab, Rfl: 0       Differential diagnoses, Supportive care, and indications for immediate follow-up discussed with patient.   Instructed to return to clinic or nearest emergency department for any change in condition, further concerns, or worsening of symptoms.    The patient demonstrated a good understanding and agreed with the treatment plan.    Ami Clayton P.A.-C.

## 2018-11-08 ENCOUNTER — HOSPITAL ENCOUNTER (OUTPATIENT)
Facility: MEDICAL CENTER | Age: 38
End: 2018-11-08
Payer: COMMERCIAL

## 2018-11-08 LAB
BDY FAT % MEASURED: 39 %
BP DIAS: 68 MMHG
BP SYS: 110 MMHG
DIABETES HTDIA: NO
EVENT NAME HTEVT: NORMAL
HYPERTENSION HTHYP: NO
SCREENING LOC CITY HTCIT: NORMAL
SCREENING LOC STATE HTSTA: NORMAL
SCREENING LOCATION HTLOC: NORMAL
SUBSCRIBER ID HTSID: NORMAL

## 2018-11-09 LAB
CHOLEST SERPL-MCNC: 179 MG/DL (ref 100–199)
FASTING STATUS PATIENT QL REPORTED: NORMAL
GLUCOSE SERPL-MCNC: 85 MG/DL (ref 65–99)
HDLC SERPL-MCNC: 34 MG/DL
LDLC SERPL CALC-MCNC: 129 MG/DL
TRIGL SERPL-MCNC: 81 MG/DL (ref 0–149)

## 2018-11-13 ENCOUNTER — TELEPHONE (OUTPATIENT)
Dept: MEDICAL GROUP | Facility: LAB | Age: 38
End: 2018-11-13

## 2018-11-13 DIAGNOSIS — Z20.9 EXPOSURE TO POTENTIAL INFECTION: ICD-10-CM

## 2018-11-13 NOTE — TELEPHONE ENCOUNTER
1. Caller Name: Jennifer                                          Call Back Number: [phone] (home)        Patient approves a detailed voicemail message: yes    Pt called this morning saying that she donated blood recently and that she may have HIV.  I've tried to call her back twice.  Do you want to order anything for her?

## 2018-11-25 ENCOUNTER — TELEPHONE (OUTPATIENT)
Dept: MEDICAL GROUP | Facility: LAB | Age: 38
End: 2018-11-25

## 2018-11-25 NOTE — TELEPHONE ENCOUNTER
Phone Number Called: [phone] (home)     Message: I left a voicemail to please call us back regarding your recent [facility] lab results. Per Dr. Ya requested the results to be mailed to the patient.    Left Message for patient to call back: yes

## 2019-01-22 ENCOUNTER — TELEPHONE (OUTPATIENT)
Dept: HEALTH INFORMATION MANAGEMENT | Facility: OTHER | Age: 39
End: 2019-01-22

## 2019-01-22 DIAGNOSIS — E78.5 DYSLIPIDEMIA: ICD-10-CM

## 2019-01-22 DIAGNOSIS — E66.9 OBESITY (BMI 35.0-39.9 WITHOUT COMORBIDITY): ICD-10-CM

## 2019-01-22 DIAGNOSIS — J45.20 ASTHMA, MILD INTERMITTENT, WELL-CONTROLLED: Chronic | ICD-10-CM

## 2019-01-22 DIAGNOSIS — G44.029 CHRONIC CLUSTER HEADACHE, NOT INTRACTABLE: Chronic | ICD-10-CM

## 2019-01-23 NOTE — TELEPHONE ENCOUNTER
Good afternoon Dr. Ya,    This patient would like to complete routine lab work prior to her appointment with you on Feb. 1st. Please review.    Thank you,    Neela

## 2019-02-01 ENCOUNTER — OFFICE VISIT (OUTPATIENT)
Dept: MEDICAL GROUP | Facility: LAB | Age: 39
End: 2019-02-01
Payer: COMMERCIAL

## 2019-02-01 ENCOUNTER — HOSPITAL ENCOUNTER (OUTPATIENT)
Dept: LAB | Facility: MEDICAL CENTER | Age: 39
End: 2019-02-01
Attending: INTERNAL MEDICINE
Payer: COMMERCIAL

## 2019-02-01 VITALS
HEIGHT: 64 IN | DIASTOLIC BLOOD PRESSURE: 70 MMHG | BODY MASS INDEX: 31.07 KG/M2 | OXYGEN SATURATION: 99 % | WEIGHT: 182 LBS | HEART RATE: 71 BPM | RESPIRATION RATE: 12 BRPM | TEMPERATURE: 97.4 F | SYSTOLIC BLOOD PRESSURE: 100 MMHG

## 2019-02-01 DIAGNOSIS — E66.09 CLASS 1 OBESITY DUE TO EXCESS CALORIES WITHOUT SERIOUS COMORBIDITY WITH BODY MASS INDEX (BMI) OF 31.0 TO 31.9 IN ADULT: ICD-10-CM

## 2019-02-01 DIAGNOSIS — Z00.00 ANNUAL PHYSICAL EXAM: ICD-10-CM

## 2019-02-01 DIAGNOSIS — Z20.9 EXPOSURE TO POTENTIAL INFECTION: ICD-10-CM

## 2019-02-01 DIAGNOSIS — E78.5 DYSLIPIDEMIA: ICD-10-CM

## 2019-02-01 PROBLEM — E66.9 CLASS 1 OBESITY IN ADULT: Status: ACTIVE | Noted: 2019-02-01

## 2019-02-01 PROBLEM — E66.811 CLASS 1 OBESITY IN ADULT: Status: ACTIVE | Noted: 2019-02-01

## 2019-02-01 PROCEDURE — 99214 OFFICE O/P EST MOD 30 MIN: CPT | Performed by: INTERNAL MEDICINE

## 2019-02-01 PROCEDURE — 86480 TB TEST CELL IMMUN MEASURE: CPT

## 2019-02-01 PROCEDURE — 36415 COLL VENOUS BLD VENIPUNCTURE: CPT

## 2019-02-01 ASSESSMENT — PATIENT HEALTH QUESTIONNAIRE - PHQ9: CLINICAL INTERPRETATION OF PHQ2 SCORE: 0

## 2019-02-02 LAB
GAMMA INTERFERON BACKGROUND BLD IA-ACNC: 0.03 IU/ML
M TB IFN-G BLD-IMP: NEGATIVE
M TB IFN-G CD4+ BCKGRND COR BLD-ACNC: 0 IU/ML
MITOGEN IGNF BCKGRD COR BLD-ACNC: >10 IU/ML
QFT TB2 - NIL TBQ2: 0 IU/ML

## 2019-02-02 NOTE — PROGRESS NOTES
CC: Jennifer Cano is a 38 y.o. female is suffering from   Chief Complaint   Patient presents with   • Annual Exam         SUBJECTIVE:  1. 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 Dyslipidemia  Patient with a history of dyslipidemia significantly improved with keto diet    3. Exposure to potential infection  Patient works at [employer] is exposed to multiple diseases will check for tuberculosis    4. Class 1 obesity due to excess calories without serious comorbidity with body mass index (BMI) of 31.0 to 31.9 in adult  Patient with a history of obesity is doing quite well with her diet        Past social, family, history: , single mother 2 children  Social History   Substance Use Topics   • Smoking status: Never Smoker   • Smokeless tobacco: Never Used   • Alcohol use Yes         MEDICATIONS:    Current Outpatient Prescriptions:   •  cholecalciferol (VITAMIN D3) 400 UNIT Tab, Take 400 Units by mouth every day., Disp: , Rfl:   •  trazodone (DESYREL) 50 MG Tab, Take 1 Tab by mouth at bedtime as needed for Sleep., Disp: 60 Tab, Rfl: 3  •  Multiple Vitamins-Minerals (MULTIVITAMIN & MINERAL PO), Take  by mouth., Disp: , Rfl:   •  magnesium gluconate (MAG-G) 500 MG tablet, Take 500 mg by mouth 3 times a day., Disp: , Rfl:   •  pravastatin (PRAVACHOL) 10 MG Tab, Take 1 Tab by mouth every day. (Patient not taking: Reported on 2/1/2019), Disp: 90 Tab, Rfl: 3  •  Chlorpheniramine-Phenylephrine (SUDAFED PE SINUS/ALLERGY) 4-10 MG Tab, Take 1 Tab by mouth every 6 hours as needed., Disp: 30 Tab, Rfl: 0  •  Multiple Minerals-Vitamins (CALCIUM CITRATE PLUS) Tab, Take  by mouth., Disp: , Rfl:   •  glucosamine Sulfate 500 MG Cap, Take 500 mg by mouth 3 times a day, with meals., Disp: , Rfl:   •  ibuprofen (MOTRIN) 600 MG Tab, Take 800 mg by mouth every 6 hours as needed., Disp: , Rfl:   •  FIORICET PO, Take  by  "mouth as needed., Disp: , Rfl:     PROBLEMS:  Patient Active Problem List    Diagnosis Date Noted   • Class 1 obesity in adult 02/01/2019   • Dyslipidemia 01/22/2019   • Obesity (BMI 35.0-39.9 without comorbidity) ([facility]) 01/06/2018   • Osteopenia 02/08/2016   • Migraines, neuralgic 06/01/2009   • Asthma, well controlled 06/01/2009       REVIEW OF SYSTEMS:  Gen.:  No Nausea, Vomiting, fever, Chills.  Heart: No chest pain.  Lungs:  No shortness of Breath.  Psychological: Sudhakar unusual Anxiety depression     PHYSICAL EXAM   Constitutional: Alert, cooperative, not in acute distress.  Cardiovascular:  Rate Rhythm is regular without murmurs rubs clicks.     Thorax & Lungs: Clear to auscultation, no wheezing, rhonchi, or rales  HENT: Normocephalic, Atraumatic.  Eyes: PERRLA, EOMI, Conjunctiva normal.   Neck: Trachia is midline no swelling of the thyroid.   Lymphatic: No lymphadenopathy noted.   Neurologic: Alert & oriented x 3, cranial nerves II through XII are intact, Normal motor function, Normal sensory function, No focal deficits noted.   Psychiatric: Affect normal, Judgment normal, Mood normal.     VITAL SIGNS:/70   Pulse 71   Temp 36.3 °C (97.4 °F) (Temporal)   Resp 12   Ht 1.626 m (5' 4\")   Wt 82.6 kg (182 lb)   SpO2 99%   BMI 31.24 kg/m²     Labs: Reviewed    Assessment:                                                     Plan:    1. Annual physical exam  Annual physical examination patient is stable, is working as a  at [employer]    2. Dyslipidemia  Recheck cholesterol  - Lipid Profile; Future    3. Exposure to potential infection  Exposure to potential infection  - Quantiferon Gold Plus TB (4 tube); Future    4. Class 1 obesity due to excess calories without serious comorbidity with body mass index (BMI) of 31.0 to 31.9 in adult  Recheck cholesterol  - Lipid Profile; Future        "

## 2019-03-05 DIAGNOSIS — F51.02 ADJUSTMENT INSOMNIA: ICD-10-CM

## 2019-03-05 RX ORDER — TRAZODONE HYDROCHLORIDE 50 MG/1
50 TABLET ORAL NIGHTLY PRN
Qty: 60 TAB | Refills: 3 | Status: SHIPPED | OUTPATIENT
Start: 2019-03-05 | End: 2019-04-03 | Stop reason: SDUPTHER

## 2019-03-05 NOTE — TELEPHONE ENCOUNTER
Was the patient seen in the last year in this department? Yes LOV 2/1/19    Does patient have an active prescription for medications requested? No     Received Request Via: Pharmacy

## 2019-03-20 ENCOUNTER — APPOINTMENT (OUTPATIENT)
Dept: RADIOLOGY | Facility: MEDICAL CENTER | Age: 39
End: 2019-03-20
Attending: OBSTETRICS & GYNECOLOGY
Payer: COMMERCIAL

## 2019-03-29 ENCOUNTER — HOSPITAL ENCOUNTER (OUTPATIENT)
Dept: RADIOLOGY | Facility: MEDICAL CENTER | Age: 39
End: 2019-03-29
Attending: OBSTETRICS & GYNECOLOGY
Payer: COMMERCIAL

## 2019-03-29 DIAGNOSIS — Z12.31 ENCOUNTER FOR SCREENING MAMMOGRAM FOR MALIGNANT NEOPLASM OF BREAST: ICD-10-CM

## 2019-03-29 PROCEDURE — 77063 BREAST TOMOSYNTHESIS BI: CPT

## 2019-04-03 ENCOUNTER — OFFICE VISIT (OUTPATIENT)
Dept: MEDICAL GROUP | Facility: LAB | Age: 39
End: 2019-04-03
Payer: COMMERCIAL

## 2019-04-03 VITALS
TEMPERATURE: 97.7 F | HEART RATE: 86 BPM | WEIGHT: 176 LBS | DIASTOLIC BLOOD PRESSURE: 56 MMHG | SYSTOLIC BLOOD PRESSURE: 108 MMHG | HEIGHT: 64 IN | BODY MASS INDEX: 30.05 KG/M2 | OXYGEN SATURATION: 97 % | RESPIRATION RATE: 12 BRPM

## 2019-04-03 DIAGNOSIS — F51.02 ADJUSTMENT INSOMNIA: ICD-10-CM

## 2019-04-03 DIAGNOSIS — F40.243 FEAR OF FLYING: ICD-10-CM

## 2019-04-03 PROCEDURE — 99213 OFFICE O/P EST LOW 20 MIN: CPT | Performed by: INTERNAL MEDICINE

## 2019-04-03 RX ORDER — ALPRAZOLAM 0.5 MG/1
0.5 TABLET ORAL NIGHTLY PRN
Qty: 5 TAB | Refills: 0 | Status: SHIPPED | OUTPATIENT
Start: 2019-04-03 | End: 2019-04-08

## 2019-04-03 RX ORDER — TRAZODONE HYDROCHLORIDE 50 MG/1
50-100 TABLET ORAL NIGHTLY PRN
Qty: 60 TAB | Refills: 3 | Status: SHIPPED | OUTPATIENT
Start: 2019-04-03 | End: 2021-02-10

## 2019-04-03 NOTE — PROGRESS NOTES
"CC: Jennifer Cano is a 38 y.o. female is suffering from   Chief Complaint   Patient presents with   • Other     fear of flying         SUBJECTIVE:  1. Fear of flying  Patient is here for follow-up, is having problems with fear flying.  We have discussed that she should try various techniques we discussed cognitive behavioral therapy along with meditation.  Recommended Mitchell Scott's book on the relaxation response    2. Adjustment insomnia  Patient with adjustment insomnia historically, is doing well with trazodone wishes to continue        Past social, family, history: , dating  Social History   Substance Use Topics   • Smoking status: Never Smoker   • Smokeless tobacco: Never Used   • Alcohol use Yes         MEDICATIONS:    Current Outpatient Prescriptions:   •  Misc. Devices Misc, \"The Relaxation Response \" by Mitchell Scott MD, Disp: 1 Each, Rfl: 0  •  ALPRAZolam (XANAX) 0.5 MG Tab, Take 1 Tab by mouth at bedtime as needed for Anxiety for up to 5 days., Disp: 5 Tab, Rfl: 0  •  traZODone (DESYREL) 50 MG Tab, Take 1-2 Tabs by mouth at bedtime as needed for Sleep for up to 30 days., Disp: 60 Tab, Rfl: 3  •  cholecalciferol (VITAMIN D3) 400 UNIT Tab, Take 400 Units by mouth every day., Disp: , Rfl:   •  magnesium gluconate (MAG-G) 500 MG tablet, Take 500 mg by mouth 3 times a day., Disp: , Rfl:   •  pravastatin (PRAVACHOL) 10 MG Tab, Take 1 Tab by mouth every day. (Patient not taking: Reported on 4/3/2019), Disp: 90 Tab, Rfl: 3  •  Chlorpheniramine-Phenylephrine (SUDAFED PE SINUS/ALLERGY) 4-10 MG Tab, Take 1 Tab by mouth every 6 hours as needed. (Patient not taking: Reported on 4/3/2019), Disp: 30 Tab, Rfl: 0  •  Multiple Minerals-Vitamins (CALCIUM CITRATE PLUS) Tab, Take  by mouth., Disp: , Rfl:   •  Multiple Vitamins-Minerals (MULTIVITAMIN & MINERAL PO), Take  by mouth., Disp: , Rfl:   •  glucosamine Sulfate 500 MG Cap, Take 500 mg by mouth 3 times a day, with meals., Disp: , Rfl:   •  " "ibuprofen (MOTRIN) 600 MG Tab, Take 800 mg by mouth every 6 hours as needed., Disp: , Rfl:   •  FIORICET PO, Take  by mouth as needed., Disp: , Rfl:     PROBLEMS:  Patient Active Problem List    Diagnosis Date Noted   • Class 1 obesity in adult 02/01/2019   • Dyslipidemia 01/22/2019   • Obesity (BMI 35.0-39.9 without comorbidity) ([facility]) 01/06/2018   • Osteopenia 02/08/2016   • Migraines, neuralgic 06/01/2009   • Asthma, well controlled 06/01/2009       REVIEW OF SYSTEMS:  Gen.:  No Nausea, Vomiting, fever, Chills.  Heart: No chest pain.  Lungs:  No shortness of Breath.  Psychological: Sudhakar unusual Anxiety depression     PHYSICAL EXAM   Constitutional: Alert, cooperative, not in acute distress.  Cardiovascular:  Rate Rhythm is regular without murmurs rubs clicks.     Thorax & Lungs: Clear to auscultation, no wheezing, rhonchi, or rales  HENT: Normocephalic, Atraumatic.  Eyes: PERRLA, EOMI, Conjunctiva normal.   Neck: Trachia is midline no swelling of the thyroid.   Lymphatic: No lymphadenopathy noted.   Neurologic: Alert & oriented x 3, cranial nerves II through XII are intact, Normal motor function, Normal sensory function, No focal deficits noted.   Psychiatric: Affect normal, Judgment normal, Mood normal.     VITAL SIGNS:/56   Pulse 86   Temp 36.5 °C (97.7 °F) (Temporal)   Resp 12   Ht 1.626 m (5' 4\")   Wt 79.8 kg (176 lb)   SpO2 97%   BMI 30.21 kg/m²     Labs: Reviewed    Assessment:                                                     Plan:    1. Fear of flying  Fear flying recommended the relaxation response patient seeing a therapist who is doing cognitive behavioral therapy patient is to use Xanax 1 hour before flying  - Misc. Devices Misc; \"The Relaxation Response \" by Mitchell Scott MD  Dispense: 1 Each; Refill: 0  - ALPRAZolam (XANAX) 0.5 MG Tab; Take 1 Tab by mouth at bedtime as needed for Anxiety for up to 5 days.  Dispense: 5 Tab; Refill: 0    2. Adjustment insomnia  Trazodone prescription " rewritten  - traZODone (DESYREL) 50 MG Tab; Take 1-2 Tabs by mouth at bedtime as needed for Sleep for up to 30 days.  Dispense: 60 Tab; Refill: 3

## 2019-10-25 ENCOUNTER — HOSPITAL ENCOUNTER (OUTPATIENT)
Facility: MEDICAL CENTER | Age: 39
End: 2019-10-25
Attending: NURSE PRACTITIONER
Payer: COMMERCIAL

## 2019-10-25 ENCOUNTER — OFFICE VISIT (OUTPATIENT)
Dept: URGENT CARE | Facility: CLINIC | Age: 39
End: 2019-10-25
Payer: COMMERCIAL

## 2019-10-25 VITALS
BODY MASS INDEX: 33.87 KG/M2 | DIASTOLIC BLOOD PRESSURE: 82 MMHG | HEART RATE: 83 BPM | OXYGEN SATURATION: 98 % | HEIGHT: 64 IN | TEMPERATURE: 97.4 F | RESPIRATION RATE: 16 BRPM | SYSTOLIC BLOOD PRESSURE: 140 MMHG | WEIGHT: 198.4 LBS

## 2019-10-25 DIAGNOSIS — N94.89 VAGINAL CRAMPING: ICD-10-CM

## 2019-10-25 DIAGNOSIS — N73.9 PELVIC INFLAMMATORY DISEASE: ICD-10-CM

## 2019-10-25 LAB
APPEARANCE UR: CLEAR
BILIRUB UR STRIP-MCNC: NORMAL MG/DL
CANDIDA DNA VAG QL PROBE+SIG AMP: NEGATIVE
COLOR UR AUTO: YELLOW
G VAGINALIS DNA VAG QL PROBE+SIG AMP: NEGATIVE
GLUCOSE UR STRIP.AUTO-MCNC: NORMAL MG/DL
INT CON NEG: NEGATIVE
INT CON POS: POSITIVE
KETONES UR STRIP.AUTO-MCNC: NORMAL MG/DL
LEUKOCYTE ESTERASE UR QL STRIP.AUTO: NORMAL
NITRITE UR QL STRIP.AUTO: NORMAL
PH UR STRIP.AUTO: 7 [PH] (ref 5–8)
POC URINE PREGNANCY TEST: NEGATIVE
PROT UR QL STRIP: NORMAL MG/DL
RBC UR QL AUTO: NORMAL
SP GR UR STRIP.AUTO: 1
T VAGINALIS DNA VAG QL PROBE+SIG AMP: NEGATIVE
UROBILINOGEN UR STRIP-MCNC: 0.2 MG/DL

## 2019-10-25 PROCEDURE — 87510 GARDNER VAG DNA DIR PROBE: CPT

## 2019-10-25 PROCEDURE — 87480 CANDIDA DNA DIR PROBE: CPT

## 2019-10-25 PROCEDURE — 81002 URINALYSIS NONAUTO W/O SCOPE: CPT | Performed by: NURSE PRACTITIONER

## 2019-10-25 PROCEDURE — 87660 TRICHOMONAS VAGIN DIR PROBE: CPT

## 2019-10-25 PROCEDURE — 99214 OFFICE O/P EST MOD 30 MIN: CPT | Mod: 25 | Performed by: NURSE PRACTITIONER

## 2019-10-25 PROCEDURE — 81025 URINE PREGNANCY TEST: CPT | Performed by: NURSE PRACTITIONER

## 2019-10-25 PROCEDURE — 87591 N.GONORRHOEAE DNA AMP PROB: CPT

## 2019-10-25 PROCEDURE — 87491 CHLMYD TRACH DNA AMP PROBE: CPT

## 2019-10-25 RX ORDER — METRONIDAZOLE 500 MG/1
500 TABLET ORAL 2 TIMES DAILY
Qty: 28 TAB | Refills: 0 | Status: SHIPPED | OUTPATIENT
Start: 2019-10-25 | End: 2019-11-08

## 2019-10-25 RX ORDER — DOXYCYCLINE HYCLATE 100 MG
100 TABLET ORAL 2 TIMES DAILY
Qty: 28 TAB | Refills: 0 | Status: SHIPPED | OUTPATIENT
Start: 2019-10-25 | End: 2019-11-08

## 2019-10-25 ASSESSMENT — ENCOUNTER SYMPTOMS
MYALGIAS: 0
BACK PAIN: 0
RESPIRATORY NEGATIVE: 1
CHILLS: 0
SHORTNESS OF BREATH: 0
EYES NEGATIVE: 1
DIARRHEA: 0
WHEEZING: 0
PALPITATIONS: 0
CONSTIPATION: 0
NAUSEA: 0
ABDOMINAL PAIN: 0
VOMITING: 0
FEVER: 0
FLANK PAIN: 0

## 2019-10-25 NOTE — PROGRESS NOTES
"Subjective:   Jennifer Cano is a 39 y.o. female who presents for Abdominal Pain (uterus, back cramps as well, x 2 weeks )        HPI   Patient with new onset lower abdominal pain that has been ongoing for the past 2 weeks. Denies nausea, vomiting, or diarrhea. Denies fever, chills or urinary symptoms. Cramping is mild to moderate and waxes/wanes. Denies alleviating or aggravating factors.  Denies vaginal odor or discharge.  Denies GI history.  LMP 2 weeks ago.    Review of Systems   Constitutional: Negative for chills and fever.   Eyes: Negative.    Respiratory: Negative.  Negative for shortness of breath and wheezing.    Cardiovascular: Negative for chest pain and palpitations.   Gastrointestinal: Negative for abdominal pain, constipation, diarrhea, nausea and vomiting.   Genitourinary: Negative for dysuria, flank pain, frequency, hematuria and urgency.        Cramping   Musculoskeletal: Negative for back pain and myalgias.   Skin: Negative.      Patient's PMH, SocHx, SurgHx, FamHx, Drug allergies and medications reviewed.     Objective:   /82   Pulse 83   Temp 36.3 °C (97.4 °F) (Temporal)   Resp 16   Ht 1.627 m (5' 4.05\")   Wt 90 kg (198 lb 6.4 oz)   SpO2 98%   BMI 34.00 kg/m²   Physical Exam   Constitutional: She is oriented to person, place, and time. She appears well-developed and well-nourished. No distress.   HENT:   Head: Normocephalic.   Right Ear: Hearing normal.   Left Ear: Hearing normal.   Mouth/Throat: Oropharynx is clear and moist and mucous membranes are normal.   Eyes: Pupils are equal, round, and reactive to light. Conjunctivae are normal.   Cardiovascular: Normal rate, regular rhythm and normal heart sounds.   No murmur heard.  Pulmonary/Chest: Effort normal and breath sounds normal. No respiratory distress.   Abdominal: Soft. Normal appearance and bowel sounds are normal. She exhibits no distension. There is tenderness in the suprapubic area. There is no rigidity, no " rebound, no guarding and no CVA tenderness.   Genitourinary: Uterus normal. Pelvic exam was performed with patient prone. Cervix exhibits motion tenderness. Right adnexum displays tenderness. Left adnexum displays no mass and no tenderness. Vaginal discharge found.   Neurological: She is alert and oriented to person, place, and time.   Skin: Skin is warm and dry. Capillary refill takes less than 2 seconds. No rash noted. She is not diaphoretic.   Psychiatric: She has a normal mood and affect. Her speech is normal and behavior is normal. Judgment and thought content normal.   Vitals reviewed.        Assessment/Plan:   Assessment    1. Vaginal cramping  - POCT Urinalysis  - POCT Pregnancy  - VAGINAL PATHOGENS DNA PANEL; Future  - CHLAMYDIA/GC PCR URINE OR SWAB; Future    2. Pelvic inflammatory disease  - cefTRIAXone (ROCEPHIN) 250 mg, lidocaine (XYLOCAINE) 1 % 0.9 mL for IM use  - doxycycline (VIBRAMYCIN) 100 MG Tab; Take 1 Tab by mouth 2 times a day for 14 days.  Dispense: 28 Tab; Refill: 0  - metroNIDAZOLE (FLAGYL) 500 MG Tab; Take 1 Tab by mouth 2 times a day for 14 days.  Dispense: 28 Tab; Refill: 0    UA, pregnancy negative.  Will send vaginal swabs and call with results. Discussed to not drink alcohol while taking medications. Use an oral probiotic daily, such as Culturelle, Align, or yogurt to reduce gastrointestinal symptoms from antibiotic use.    Differential diagnosis, natural history, supportive care, and indications for immediate follow-up discussed.     **Please note that all invasive procedures during this visit were performed by myself and/or the Medical Assistant under the supervision of the PA or MD in office**

## 2019-10-26 LAB
C TRACH DNA SPEC QL NAA+PROBE: NEGATIVE
N GONORRHOEA DNA SPEC QL NAA+PROBE: NEGATIVE
SPECIMEN SOURCE: NORMAL

## 2019-10-27 ENCOUNTER — TELEPHONE (OUTPATIENT)
Dept: URGENT CARE | Facility: CLINIC | Age: 39
End: 2019-10-27

## 2019-10-28 ENCOUNTER — TELEPHONE (OUTPATIENT)
Dept: URGENT CARE | Facility: CLINIC | Age: 39
End: 2019-10-28

## 2019-10-29 ENCOUNTER — TELEPHONE (OUTPATIENT)
Dept: URGENT CARE | Facility: MEDICAL CENTER | Age: 39
End: 2019-10-29

## 2019-10-29 DIAGNOSIS — N94.89 VAGINAL CRAMPING: ICD-10-CM

## 2019-10-29 NOTE — TELEPHONE ENCOUNTER
Patient had called asking about results. She stated that she was still having symptoms. I let her know she could come back in for a reevaluation or you could talk to her. She stated that she would talk to her gynecologist and see if she could get an appointment in.

## 2019-10-29 NOTE — TELEPHONE ENCOUNTER
Called and discussed for patient to stop taking antibiotics. Denies n/v/d or fevers, but states she is not having improvement of symptoms. Prior hx of left ovarian cyst. Ordered CT and patient is to have scheduled.

## 2019-11-15 ENCOUNTER — TELEPHONE (OUTPATIENT)
Dept: MEDICAL GROUP | Facility: LAB | Age: 39
End: 2019-11-15

## 2019-11-15 NOTE — TELEPHONE ENCOUNTER
1. Caller Name: Jennifer Cano   Call Back Number: [phone] (home)         Patient approves a detailed voicemail message: N\A    Patient called and LVM asking if she was up to date on her TDAP vaccine

## 2019-11-15 NOTE — TELEPHONE ENCOUNTER
1. Caller Name: Jennifer Cano   Call Back Number: [phone] (home)         Patient approves a detailed voicemail message: N\A    Called and informed patient she is not dur she last had her TDAP 08/23/2013 no due until 2023 of aug.

## 2021-01-07 ENCOUNTER — TELEPHONE (OUTPATIENT)
Dept: MEDICAL GROUP | Facility: LAB | Age: 41
End: 2021-01-07

## 2021-01-07 DIAGNOSIS — E66.9 OBESITY (BMI 35.0-39.9 WITHOUT COMORBIDITY): ICD-10-CM

## 2021-01-08 ENCOUNTER — HOSPITAL ENCOUNTER (OUTPATIENT)
Dept: LAB | Facility: MEDICAL CENTER | Age: 41
End: 2021-01-08
Payer: COMMERCIAL

## 2021-01-08 PROCEDURE — U0003 INFECTIOUS AGENT DETECTION BY NUCLEIC ACID (DNA OR RNA); SEVERE ACUTE RESPIRATORY SYNDROME CORONAVIRUS 2 (SARS-COV-2) (CORONAVIRUS DISEASE [COVID-19]), AMPLIFIED PROBE TECHNIQUE, MAKING USE OF HIGH THROUGHPUT TECHNOLOGIES AS DESCRIBED BY CMS-2020-01-R: HCPCS

## 2021-01-08 PROCEDURE — U0005 INFEC AGEN DETEC AMPLI PROBE: HCPCS

## 2021-01-08 NOTE — TELEPHONE ENCOUNTER
1. Caller Name: Jennifer                         Call Back Number: [phone] (home)        How would the patient prefer to be contacted with a response: Phone call OK to leave a detailed message    Pt has an appt in February.  She is asking to have labs done prior including cholesterol, liver and diabetes check.

## 2021-01-09 LAB
COVID ORDER STATUS COVID19: NORMAL
SARS-COV-2 RNA RESP QL NAA+PROBE: NOTDETECTED
SPECIMEN SOURCE: NORMAL

## 2021-01-11 ENCOUNTER — OFFICE VISIT (OUTPATIENT)
Dept: URGENT CARE | Facility: PHYSICIAN GROUP | Age: 41
End: 2021-01-11
Payer: COMMERCIAL

## 2021-01-11 VITALS
OXYGEN SATURATION: 93 % | TEMPERATURE: 98.3 F | DIASTOLIC BLOOD PRESSURE: 66 MMHG | SYSTOLIC BLOOD PRESSURE: 118 MMHG | WEIGHT: 243 LBS | RESPIRATION RATE: 20 BRPM | HEART RATE: 80 BPM | HEIGHT: 64 IN | BODY MASS INDEX: 41.48 KG/M2

## 2021-01-11 DIAGNOSIS — R03.0 ELEVATED BLOOD-PRESSURE READING WITHOUT DIAGNOSIS OF HYPERTENSION: ICD-10-CM

## 2021-01-11 PROCEDURE — 99213 OFFICE O/P EST LOW 20 MIN: CPT | Performed by: STUDENT IN AN ORGANIZED HEALTH CARE EDUCATION/TRAINING PROGRAM

## 2021-01-12 NOTE — PROGRESS NOTES
Subjective:   CHIEF COMPLAINT  Chief Complaint   Patient presents with   • Other     high blood pressure, has pcp appt 02/02/21       [facility]  Jennifer Cano is a 40 y.o. female who presents with a chief complaint of concerns of having high blood pressure.  Patient reports she has been checking her blood pressure at home with a automatic cuff which has been giving her high readings in the 150s over 80s.  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  She has no previous history of high blood pressure is not currently on any oral antihypertensive medications.  Patient has been concerned about her blood pressure because she was having a headache and palpitations and thought that this was secondary to elevated blood pressure.  She is currently asymptomatic and is not experiencing any headache, chest pain, palpitations, shortness of breath or wheezing.    REVIEW OF SYSTEMS  General: no fever or chills  GI: no nausea or vomiting  See HPI for further details.    PAST MEDICAL HISTORY  Patient Active Problem List    Diagnosis Date Noted   • Class 1 obesity in adult 02/01/2019   • Dyslipidemia 01/22/2019   • Obesity (BMI 35.0-39.9 without comorbidity) ([facility]) 01/06/2018   • Osteopenia 02/08/2016   • Migraines, neuralgic 06/01/2009   • Asthma, well controlled 06/01/2009       SURGICAL HISTORY   has a past surgical history that includes primary c section.    ALLERGIES  Allergies   Allergen Reactions   • Neomycin-Polymyxin B Rash       CURRENT MEDICATIONS  Home Medications     Reviewed by Vincent Melendez Ass't (Medical Assistant) on 01/11/21 at 1825  Med List Status: <None>   Medication Last Dose Status   cholecalciferol (VITAMIN D3) 400 UNIT Tab Not Taking Active   FIORICET PO Not Taking Active   glucosamine Sulfate 500 MG Cap Not Taking Active   ibuprofen (MOTRIN) 600 MG Tab PRN Active   magnesium gluconate (MAG-G) 500 MG tablet Not Taking Active   Misc.  "Devices Misc Not Taking Active   Multiple Minerals-Vitamins (CALCIUM CITRATE PLUS) Tab Not Taking Active   Multiple Vitamins-Minerals (MULTIVITAMIN & MINERAL PO) Not Taking Active                SOCIAL HISTORY  Social History     Tobacco Use   • Smoking status: Never Smoker   • Smokeless tobacco: Never Used   Substance and Sexual Activity   • Alcohol use: Yes   • Drug use: Yes     Types: Marijuana     Comment: prn   • Sexual activity: Not Currently       FAMILY HISTORY  Family History   Problem Relation Age of Onset   • Hypertension Mother           Objective:   PHYSICAL EXAM  VITAL SIGNS: /66 (BP Location: Left arm, Patient Position: Sitting, BP Cuff Size: Adult)   Pulse 80   Temp 36.8 °C (98.3 °F) (Temporal)   Resp 20   Ht 1.626 m (5' 4\")   Wt 110.2 kg (243 lb)   SpO2 93%   BMI 41.71 kg/m²     Gen: no acute distress, normal voice  Skin: dry, intact, moist mucosal membranes  Lungs: CTAB w/ symmetric expansion  CV: RRR w/o murmurs or clicks  Psych: normal affect, normal judgement, alert, awake    Assessment/Plan:     1. Elevated blood-pressure reading without diagnosis of hypertension     Blood pressure tonight at 118/66.  She is completely asymptomatic and provided reassurance.  Encouraged the patient to follow-up with her primary care physician for reevaluation.  Discussed any red flags and when to return the clinic or go the emergency room.  The patient verbalized understanding.  All questions were answered.    Differential diagnosis, natural history, supportive care, and indications for immediate follow-up discussed. All questions answered. Patient agrees with the plan of care.    Follow-up as needed if symptoms worsen or fail to improve to PCP, Urgent care or Emergency Room.    Please note that this dictation was created using voice recognition software. I have made a reasonable attempt to correct obvious errors, but I expect that there are errors of grammar and possibly content that I did not " discover before finalizing the note.

## 2021-01-18 ENCOUNTER — TELEPHONE (OUTPATIENT)
Dept: MEDICAL GROUP | Facility: LAB | Age: 41
End: 2021-01-18

## 2021-01-18 DIAGNOSIS — Z20.822 EXPOSURE TO COVID-19 VIRUS: ICD-10-CM

## 2021-01-23 ENCOUNTER — HOSPITAL ENCOUNTER (OUTPATIENT)
Dept: LAB | Facility: MEDICAL CENTER | Age: 41
End: 2021-01-23
Attending: INTERNAL MEDICINE
Payer: COMMERCIAL

## 2021-01-23 DIAGNOSIS — E66.9 OBESITY (BMI 35.0-39.9 WITHOUT COMORBIDITY): ICD-10-CM

## 2021-01-23 DIAGNOSIS — Z20.822 EXPOSURE TO COVID-19 VIRUS: ICD-10-CM

## 2021-01-23 LAB
ALBUMIN SERPL BCP-MCNC: 4.4 G/DL (ref 3.2–4.9)
ALBUMIN/GLOB SERPL: 1.6 G/DL
ALP SERPL-CCNC: 80 U/L (ref 30–99)
ALT SERPL-CCNC: 24 U/L (ref 2–50)
ANION GAP SERPL CALC-SCNC: 12 MMOL/L (ref 7–16)
AST SERPL-CCNC: 18 U/L (ref 12–45)
BASOPHILS # BLD AUTO: 0.3 % (ref 0–1.8)
BASOPHILS # BLD: 0.02 K/UL (ref 0–0.12)
BILIRUB SERPL-MCNC: 0.8 MG/DL (ref 0.1–1.5)
BUN SERPL-MCNC: 11 MG/DL (ref 8–22)
CALCIUM SERPL-MCNC: 9.1 MG/DL (ref 8.5–10.5)
CHLORIDE SERPL-SCNC: 102 MMOL/L (ref 96–112)
CHOLEST SERPL-MCNC: 236 MG/DL (ref 100–199)
CO2 SERPL-SCNC: 23 MMOL/L (ref 20–33)
CREAT SERPL-MCNC: 0.66 MG/DL (ref 0.5–1.4)
EOSINOPHIL # BLD AUTO: 0.1 K/UL (ref 0–0.51)
EOSINOPHIL NFR BLD: 1.6 % (ref 0–6.9)
ERYTHROCYTE [DISTWIDTH] IN BLOOD BY AUTOMATED COUNT: 40.8 FL (ref 35.9–50)
EST. AVERAGE GLUCOSE BLD GHB EST-MCNC: 123 MG/DL
GLOBULIN SER CALC-MCNC: 2.7 G/DL (ref 1.9–3.5)
GLUCOSE SERPL-MCNC: 94 MG/DL (ref 65–99)
HBA1C MFR BLD: 5.9 % (ref 0–5.6)
HCT VFR BLD AUTO: 43.5 % (ref 37–47)
HDLC SERPL-MCNC: 38 MG/DL
HGB BLD-MCNC: 14.2 G/DL (ref 12–16)
IMM GRANULOCYTES # BLD AUTO: 0.01 K/UL (ref 0–0.11)
IMM GRANULOCYTES NFR BLD AUTO: 0.2 % (ref 0–0.9)
LDLC SERPL CALC-MCNC: 177 MG/DL
LYMPHOCYTES # BLD AUTO: 2.44 K/UL (ref 1–4.8)
LYMPHOCYTES NFR BLD: 38.4 % (ref 22–41)
MCH RBC QN AUTO: 29.6 PG (ref 27–33)
MCHC RBC AUTO-ENTMCNC: 32.6 G/DL (ref 33.6–35)
MCV RBC AUTO: 90.8 FL (ref 81.4–97.8)
MONOCYTES # BLD AUTO: 0.42 K/UL (ref 0–0.85)
MONOCYTES NFR BLD AUTO: 6.6 % (ref 0–13.4)
NEUTROPHILS # BLD AUTO: 3.36 K/UL (ref 2–7.15)
NEUTROPHILS NFR BLD: 52.9 % (ref 44–72)
NRBC # BLD AUTO: 0 K/UL
NRBC BLD-RTO: 0 /100 WBC
PLATELET # BLD AUTO: 231 K/UL (ref 164–446)
PMV BLD AUTO: 10.2 FL (ref 9–12.9)
POTASSIUM SERPL-SCNC: 3.9 MMOL/L (ref 3.6–5.5)
PROT SERPL-MCNC: 7.1 G/DL (ref 6–8.2)
RBC # BLD AUTO: 4.79 M/UL (ref 4.2–5.4)
SARS-COV-2 AB SERPL QL IA: NORMAL
SODIUM SERPL-SCNC: 137 MMOL/L (ref 135–145)
TRIGL SERPL-MCNC: 103 MG/DL (ref 0–149)
WBC # BLD AUTO: 6.4 K/UL (ref 4.8–10.8)

## 2021-01-23 PROCEDURE — 36415 COLL VENOUS BLD VENIPUNCTURE: CPT

## 2021-01-23 PROCEDURE — 86769 SARS-COV-2 COVID-19 ANTIBODY: CPT

## 2021-01-23 PROCEDURE — 80053 COMPREHEN METABOLIC PANEL: CPT

## 2021-01-23 PROCEDURE — 80061 LIPID PANEL: CPT

## 2021-01-23 PROCEDURE — 83036 HEMOGLOBIN GLYCOSYLATED A1C: CPT

## 2021-01-23 PROCEDURE — 85025 COMPLETE CBC W/AUTO DIFF WBC: CPT

## 2021-02-10 ENCOUNTER — OFFICE VISIT (OUTPATIENT)
Dept: MEDICAL GROUP | Facility: LAB | Age: 41
End: 2021-02-10
Payer: COMMERCIAL

## 2021-02-10 VITALS
BODY MASS INDEX: 38.51 KG/M2 | DIASTOLIC BLOOD PRESSURE: 80 MMHG | RESPIRATION RATE: 14 BRPM | WEIGHT: 225.6 LBS | HEIGHT: 64 IN | TEMPERATURE: 97.4 F | SYSTOLIC BLOOD PRESSURE: 120 MMHG | OXYGEN SATURATION: 97 % | HEART RATE: 75 BPM

## 2021-02-10 DIAGNOSIS — R73.02 IGT (IMPAIRED GLUCOSE TOLERANCE): ICD-10-CM

## 2021-02-10 DIAGNOSIS — R63.5 WEIGHT GAIN: ICD-10-CM

## 2021-02-10 PROCEDURE — 99213 OFFICE O/P EST LOW 20 MIN: CPT | Performed by: INTERNAL MEDICINE

## 2021-02-10 RX ORDER — IBUPROFEN 800 MG/1
800 TABLET ORAL EVERY 8 HOURS PRN
COMMUNITY
End: 2023-05-17

## 2021-02-10 ASSESSMENT — FIBROSIS 4 INDEX: FIB4 SCORE: 0.64

## 2021-02-11 NOTE — PROGRESS NOTES
"CC: Jennifer Cano is a 40 y.o. female is suffering from   Chief Complaint   Patient presents with   • Annual Exam         SUBJECTIVE:  1. Weight gain  Patient is here for follow-up, patient suffering from weight gain, is concerned that morbid obesity, patient is a  who also is raising by herself 2 children.  Patient has minimal support.  Ex- is not paying child support or helping her.    2. IGT (impaired glucose tolerance)  Worrisome for developing diabetes discussed with the patient the importance of diet and exercise        Past social, family, history:   Social History     Tobacco Use   • Smoking status: Never Smoker   • Smokeless tobacco: Never Used   Substance Use Topics   • Alcohol use: Yes   • Drug use: Yes     Types: Marijuana     Comment: prn         MEDICATIONS:    Current Outpatient Medications:   •  ibuprofen (MOTRIN) 800 MG Tab, Take 800 mg by mouth every 8 hours as needed., Disp: , Rfl:   •  traZODone (DESYREL) 50 MG Tab, Take 1-2 Tabs by mouth at bedtime as needed for Sleep for up to 30 days., Disp: 60 Tab, Rfl: 3  •  Misc. Devices Misc, \"The Relaxation Response \" by Mitchell Scott MD (Patient not taking: Reported on 1/11/2021), Disp: 1 Each, Rfl: 0  •  cholecalciferol (VITAMIN D3) 400 UNIT Tab, Take 400 Units by mouth every day., Disp: , Rfl:   •  Multiple Minerals-Vitamins (CALCIUM CITRATE PLUS) Tab, Take  by mouth., Disp: , Rfl:   •  Multiple Vitamins-Minerals (MULTIVITAMIN & MINERAL PO), Take  by mouth., Disp: , Rfl:   •  glucosamine Sulfate 500 MG Cap, Take 500 mg by mouth 3 times a day, with meals., Disp: , Rfl:   •  magnesium gluconate (MAG-G) 500 MG tablet, Take 500 mg by mouth 3 times a day., Disp: , Rfl:   •  ibuprofen (MOTRIN) 600 MG Tab, Take 800 mg by mouth every 6 hours as needed., Disp: , Rfl:   •  FIORICET PO, Take  by mouth as needed., Disp: , Rfl:     PROBLEMS:  Patient Active Problem List    Diagnosis Date Noted   • Class 1 obesity in adult " "02/01/2019   • Dyslipidemia 01/22/2019   • Obesity (BMI 35.0-39.9 without comorbidity) ([facility]) 01/06/2018   • Osteopenia 02/08/2016   • Migraines, neuralgic 06/01/2009   • Asthma, well controlled 06/01/2009       REVIEW OF SYSTEMS:  Gen.:  No Nausea, Vomiting, fever, Chills.  Heart: No chest pain.  Lungs:  No shortness of Breath.  Psychological: Sudhakar unusual Anxiety depression     PHYSICAL EXAM   Constitutional: Alert, cooperative, not in acute distress.  Cardiovascular:  Rate Rhythm is regular without murmurs rubs clicks.     Thorax & Lungs: Clear to auscultation, no wheezing, rhonchi, or rales  HENT: Normocephalic, Atraumatic.  Eyes: PERRLA, EOMI, Conjunctiva normal.   Neck: Trachia is midline no swelling of the thyroid.   Lymphatic: No lymphadenopathy noted.   Neurologic: Alert & oriented x 3, cranial nerves II through XII are intact, Normal motor function, Normal sensory function, No focal deficits noted.   Psychiatric: Affect normal, Judgment normal, Mood normal.     VITAL SIGNS:/80   Pulse 75   Temp 36.3 °C (97.4 °F) (Temporal)   Resp 14   Ht 1.626 m (5' 4\")   Wt 102 kg (225 lb 9.6 oz)   SpO2 97%   BMI 38.72 kg/m²     Labs: Reviewed    Assessment:                                                     Plan:    1. Weight gain  Referral to weight management  - REFERRAL TO [facility] HEALTH IMPROVEMENT PROGRAMS (HIP)    2. IGT (impaired glucose tolerance)  Recheck hemoglobin A1c in 3 months  - HEMOGLOBIN A1C; Future        "

## 2021-04-26 RX ORDER — TRAZODONE HYDROCHLORIDE 50 MG/1
TABLET ORAL
Qty: 60 TABLET | Refills: 5 | Status: SHIPPED | OUTPATIENT
Start: 2021-04-26 | End: 2022-08-22 | Stop reason: SDUPTHER

## 2021-06-07 DIAGNOSIS — Z20.9 HISTORY OF EXPOSURE TO INFECTIOUS DISEASE: ICD-10-CM

## 2021-07-30 ENCOUNTER — HOSPITAL ENCOUNTER (OUTPATIENT)
Dept: LAB | Facility: MEDICAL CENTER | Age: 41
End: 2021-07-30
Attending: INTERNAL MEDICINE
Payer: COMMERCIAL

## 2021-07-30 DIAGNOSIS — Z20.9 HISTORY OF EXPOSURE TO INFECTIOUS DISEASE: ICD-10-CM

## 2021-07-30 LAB
ALBUMIN SERPL BCP-MCNC: 4.1 G/DL (ref 3.2–4.9)
ALP SERPL-CCNC: 72 U/L (ref 30–99)
ALT SERPL-CCNC: 17 U/L (ref 2–50)
AST SERPL-CCNC: 19 U/L (ref 12–45)
BILIRUB CONJ SERPL-MCNC: <0.2 MG/DL (ref 0.1–0.5)
BILIRUB INDIRECT SERPL-MCNC: NORMAL MG/DL (ref 0–1)
BILIRUB SERPL-MCNC: 0.6 MG/DL (ref 0.1–1.5)
HIV 1+2 AB+HIV1 P24 AG SERPL QL IA: NORMAL
PROT SERPL-MCNC: 6.6 G/DL (ref 6–8.2)

## 2021-07-30 PROCEDURE — 36415 COLL VENOUS BLD VENIPUNCTURE: CPT

## 2021-07-30 PROCEDURE — 87389 HIV-1 AG W/HIV-1&-2 AB AG IA: CPT

## 2021-07-30 PROCEDURE — 80076 HEPATIC FUNCTION PANEL: CPT

## 2021-08-10 ENCOUNTER — OFFICE VISIT (OUTPATIENT)
Dept: MEDICAL GROUP | Facility: LAB | Age: 41
End: 2021-08-10
Payer: COMMERCIAL

## 2021-08-10 VITALS
BODY MASS INDEX: 40.8 KG/M2 | RESPIRATION RATE: 12 BRPM | WEIGHT: 239 LBS | TEMPERATURE: 97.2 F | DIASTOLIC BLOOD PRESSURE: 70 MMHG | HEART RATE: 78 BPM | OXYGEN SATURATION: 96 % | HEIGHT: 64 IN | SYSTOLIC BLOOD PRESSURE: 122 MMHG

## 2021-08-10 DIAGNOSIS — D17.9 LIPOMA, UNSPECIFIED SITE: ICD-10-CM

## 2021-08-10 DIAGNOSIS — E66.9 OBESITY (BMI 35.0-39.9 WITHOUT COMORBIDITY): ICD-10-CM

## 2021-08-10 PROCEDURE — 99213 OFFICE O/P EST LOW 20 MIN: CPT | Performed by: INTERNAL MEDICINE

## 2021-08-10 ASSESSMENT — PATIENT HEALTH QUESTIONNAIRE - PHQ9: CLINICAL INTERPRETATION OF PHQ2 SCORE: 0

## 2021-08-10 ASSESSMENT — FIBROSIS 4 INDEX: FIB4 SCORE: 0.82

## 2021-08-11 NOTE — PROGRESS NOTES
CC: Jennifer Cano is a 41 y.o. female is suffering from   Chief Complaint   Patient presents with   • Follow-Up     6 months follow up         SUBJECTIVE:  1. Lipoma, unspecified site  Ami is here for follow-up has a history of a suspected lipoma lower back.    2. Obesity (BMI 35.0-39.9 without comorbidity) ([facility])  Patient and I have discussed the importance of weight loss through diet exercise.  Patient is to have lab work done through the [facility] will review this at her next visit        Past social, family, history:   Social History     Tobacco Use   • Smoking status: Never Smoker   • Smokeless tobacco: Never Used   Vaping Use   • Vaping Use: Never used   Substance Use Topics   • Alcohol use: Yes   • Drug use: Yes     Types: Marijuana     Comment: prn         MEDICATIONS:    Current Outpatient Medications:   •  traZODone (DESYREL) 50 MG Tab, TAKE ONE OR TWO TABLETS BY MOUTH NIGHTLY AT BEDTIME AS NEEDED, Disp: 60 tablet, Rfl: 5  •  ibuprofen (MOTRIN) 800 MG Tab, Take 800 mg by mouth every 8 hours as needed., Disp: , Rfl:   •  cholecalciferol (VITAMIN D3) 400 UNIT Tab, Take 400 Units by mouth every day., Disp: , Rfl:   •  Multiple Minerals-Vitamins (CALCIUM CITRATE PLUS) Tab, Take  by mouth., Disp: , Rfl:   •  Multiple Vitamins-Minerals (MULTIVITAMIN & MINERAL PO), Take  by mouth., Disp: , Rfl:   •  magnesium gluconate (MAG-G) 500 MG tablet, Take 500 mg by mouth 3 times a day., Disp: , Rfl:     PROBLEMS:  Patient Active Problem List    Diagnosis Date Noted   • Class 1 obesity in adult 02/01/2019   • Dyslipidemia 01/22/2019   • Obesity (BMI 35.0-39.9 without comorbidity) ([facility]) 01/06/2018   • Osteopenia 02/08/2016   • Migraines, neuralgic 06/01/2009   • Asthma, well controlled 06/01/2009       REVIEW OF SYSTEMS:  Gen.:  No Nausea, Vomiting, fever, Chills.  Heart: No chest pain.  Lungs:  No shortness of Breath.  Psychological: Sudhakar unusual Anxiety depression     PHYSICAL EXAM  "  Constitutional: Alert, cooperative, not in acute distress.  Cardiovascular:  Rate Rhythm is regular without murmurs rubs clicks.     Thorax & Lungs: Clear to auscultation, no wheezing, rhonchi, or rales  HENT: Normocephalic, Atraumatic.  Eyes: PERRLA, EOMI, Conjunctiva normal.   Neck: Trachia is midline no swelling of the thyroid.   Lymphatic: No lymphadenopathy noted.   Musculoskeletal: Middle low back questionable lipoma versus other growth.  Neurologic: Alert & oriented x 3, cranial nerves II through XII are intact, Normal motor function, Normal sensory function, No focal deficits noted.   Psychiatric: Affect normal, Judgment normal, Mood normal.     VITAL SIGNS:/70   Pulse 78   Temp 36.2 °C (97.2 °F) (Temporal)   Resp 12   Ht 1.626 m (5' 4\")   Wt 108 kg (239 lb)   SpO2 96%   BMI 41.02 kg/m²     Labs: Reviewed    Assessment:                                                     Plan:    1. Lipoma, unspecified site  Watchful waiting, biopsy of rapid growth    2. Obesity (BMI 35.0-39.9 without comorbidity) (HCC)  Patient is start a weight loss program encouraged her to be careful with diet exercise.        "

## 2021-11-22 ENCOUNTER — OFFICE VISIT (OUTPATIENT)
Dept: MEDICAL GROUP | Facility: LAB | Age: 41
End: 2021-11-22
Payer: COMMERCIAL

## 2021-11-22 VITALS
HEART RATE: 80 BPM | OXYGEN SATURATION: 96 % | BODY MASS INDEX: 41.25 KG/M2 | RESPIRATION RATE: 14 BRPM | DIASTOLIC BLOOD PRESSURE: 78 MMHG | SYSTOLIC BLOOD PRESSURE: 118 MMHG | WEIGHT: 241.6 LBS | HEIGHT: 64 IN | TEMPERATURE: 97.6 F

## 2021-11-22 DIAGNOSIS — R51.9 ACUTE NONINTRACTABLE HEADACHE, UNSPECIFIED HEADACHE TYPE: ICD-10-CM

## 2021-11-22 PROCEDURE — 99213 OFFICE O/P EST LOW 20 MIN: CPT | Performed by: NURSE PRACTITIONER

## 2021-11-22 ASSESSMENT — FIBROSIS 4 INDEX: FIB4 SCORE: 0.82

## 2021-11-22 NOTE — PROGRESS NOTES
"Subjective:     CC: There were no encounter diagnoses.    HPI:   Jennifer presents today with the following:    Headache  Onset 1 week ago. Patient reports intermittent headaches, dizziness, fatigue. She thought it was her blood pressure, she has been checking at home and getting readings in the 150s/90s. BP today in office was 118/78, and upon recheck was 122/76.   Denies vision changes, chest pain, shortness of breath.   Reports that it's hard to focus, especially at work.   Patient was vaccinated against covid, got her booster 10/16/2021.     ROS:   Gen: no fevers/chills, no changes in weight  Eyes: no changes in vision  ENT: no sore throat, no hearing loss, no bloody nose  Pulm: no sob, no cough  CV: no chest pain, no palpitations  GI: no nausea/vomiting, no diarrhea  : no dysuria  MSk: no myalgias  Skin: no rash  Neuro: Positive for headaches, no numbness/tingling  Heme/Lymph: no easy bruising        - NOTE: All other systems reviewed and are negative, except as in HPI.    Objective:     Exam: /78 (BP Location: Right arm, Patient Position: Sitting, BP Cuff Size: Large adult)   Pulse 80   Temp 36.4 °C (97.6 °F)   Resp 14   Ht 1.626 m (5' 4\")   Wt 110 kg (241 lb 9.6 oz)   SpO2 96%  Body mass index is 41.47 kg/m².    General: Normal appearing. No distress.  HEENT: Normocephalic. Eyes conjunctiva clear lids without ptosis, pupils equal and reactive to light accommodation, ears normal shape and contour, canals are clear bilaterally, tympanic membranes are benign, nasal mucosa benign, oropharynx is without erythema, edema or exudates.   Neck: Supple without JVD or bruit. Thyroid is not enlarged.  Pulmonary: Clear to ausculation.  Normal effort. No rales, ronchi, or wheezing.  Cardiovascular: Regular rate and rhythm without murmur. Carotid and radial pulses are intact and equal bilaterally.  Abdomen: Soft, nontender, nondistended. Normal bowel sounds. Liver and spleen are not palpable  Neurologic: Grossly " nonfocal  Lymph: No cervical or supraclavicular lymph nodes are palpable  Skin: Warm and dry.  No obvious lesions.  Musculoskeletal: Normal gait. No extremity cyanosis, clubbing, or edema.  Psych: Normal mood and affect. Alert and oriented x3. Judgment and insight is normal.    Assessment & Plan:     41 y.o. female with the following -     1. Acute nonintractable headache, unspecified headache type  Patient's BP WNL today. Discussed that patient should bring in BP cuff to assess its accuracy. Recommend using nasal saline wash (i.e. Nedi-Pot), over-the-counter allergy medications as needed. Tylenol or Motrin as needed for headache or discomfort. Supportive care, differential diagnoses, and indications for immediate follow-up discussed with patient. Pathogenesis of diagnosis discussed including typical length and natural progression. Instructed to return to clinic for any change in condition, further concerns, or worsening of symptoms.

## 2022-06-18 DIAGNOSIS — D49.2 ABNORMAL SKIN GROWTH: ICD-10-CM

## 2022-07-15 DIAGNOSIS — E66.9 OBESITY (BMI 35.0-39.9 WITHOUT COMORBIDITY): ICD-10-CM

## 2022-07-15 DIAGNOSIS — E55.9 VITAMIN D DEFICIENCY: ICD-10-CM

## 2022-07-29 ENCOUNTER — OFFICE VISIT (OUTPATIENT)
Dept: MEDICAL GROUP | Facility: LAB | Age: 42
End: 2022-07-29
Payer: COMMERCIAL

## 2022-07-29 VITALS
WEIGHT: 251.32 LBS | DIASTOLIC BLOOD PRESSURE: 76 MMHG | HEIGHT: 64 IN | HEART RATE: 77 BPM | BODY MASS INDEX: 42.91 KG/M2 | SYSTOLIC BLOOD PRESSURE: 110 MMHG | TEMPERATURE: 97.8 F | OXYGEN SATURATION: 100 %

## 2022-07-29 DIAGNOSIS — M54.6 THORACIC BACK PAIN, UNSPECIFIED BACK PAIN LATERALITY, UNSPECIFIED CHRONICITY: ICD-10-CM

## 2022-07-29 DIAGNOSIS — M54.50 CHRONIC LOW BACK PAIN, UNSPECIFIED BACK PAIN LATERALITY, UNSPECIFIED WHETHER SCIATICA PRESENT: ICD-10-CM

## 2022-07-29 DIAGNOSIS — R53.83 FATIGUE, UNSPECIFIED TYPE: ICD-10-CM

## 2022-07-29 DIAGNOSIS — G89.29 CHRONIC LOW BACK PAIN, UNSPECIFIED BACK PAIN LATERALITY, UNSPECIFIED WHETHER SCIATICA PRESENT: ICD-10-CM

## 2022-07-29 PROCEDURE — 99214 OFFICE O/P EST MOD 30 MIN: CPT | Performed by: INTERNAL MEDICINE

## 2022-07-29 ASSESSMENT — FIBROSIS 4 INDEX: FIB4 SCORE: 0.84

## 2022-07-29 ASSESSMENT — PATIENT HEALTH QUESTIONNAIRE - PHQ9: CLINICAL INTERPRETATION OF PHQ2 SCORE: 0

## 2022-07-29 NOTE — PROGRESS NOTES
CC: Jennifer Cano is a 42 y.o. female is suffering from   Chief Complaint   Patient presents with   • Follow-Up     Nodule on spine   • Palpitations         SUBJECTIVE:  1. Fatigue, unspecified type  Snow is here for follow-up, has noticed that she is having problems with palpitations intermittently, we have discussed the possibility that she may be having problems with obstructive sleep apnea.    2. Thoracic back pain, unspecified back pain laterality, unspecified chronicity  Patient with a history of thoracic spine pain,    3. Chronic low back pain, unspecified back pain laterality, unspecified whether sciatica present  Questionable low back chronic pain        Past social, family, history:   Social History     Tobacco Use   • Smoking status: Never Smoker   • Smokeless tobacco: Never Used   Vaping Use   • Vaping Use: Never used   Substance Use Topics   • Alcohol use: Yes   • Drug use: Yes     Types: Marijuana     Comment: prn         MEDICATIONS:    Current Outpatient Medications:   •  NON SPECIFIED, Nocturnal desaturation study., Disp: 1 Each, Rfl: 0  •  traZODone (DESYREL) 50 MG Tab, TAKE ONE OR TWO TABLETS BY MOUTH NIGHTLY AT BEDTIME AS NEEDED, Disp: 60 tablet, Rfl: 5  •  ibuprofen (MOTRIN) 800 MG Tab, Take 800 mg by mouth every 8 hours as needed., Disp: , Rfl:   •  cholecalciferol (VITAMIN D3) 400 UNIT Tab, Take 400 Units by mouth every day., Disp: , Rfl:   •  Multiple Minerals-Vitamins (CALCIUM CITRATE PLUS) Tab, Take  by mouth., Disp: , Rfl:   •  Multiple Vitamins-Minerals (MULTIVITAMIN & MINERAL PO), Take  by mouth., Disp: , Rfl:   •  magnesium gluconate (MAG-G) 500 MG tablet, Take 500 mg by mouth 3 times a day., Disp: , Rfl:     PROBLEMS:  Patient Active Problem List    Diagnosis Date Noted   • Class 1 obesity in adult 02/01/2019   • Dyslipidemia 01/22/2019   • Obesity (BMI 35.0-39.9 without comorbidity) ([facility]) 01/06/2018   • Osteopenia 02/08/2016   • Migraines, neuralgic 06/01/2009   •  "Asthma, well controlled 06/01/2009       REVIEW OF SYSTEMS:  Gen.:  No Nausea, Vomiting, fever, Chills.  Heart: No chest pain.  Lungs:  No shortness of Breath.  Psychological: Sudhakar unusual Anxiety depression     PHYSICAL EXAM   Constitutional: Alert, cooperative, not in acute distress.  Cardiovascular:  Rate Rhythm is regular without murmurs rubs clicks.     Thorax & Lungs: Clear to auscultation, no wheezing, rhonchi, or rales  HENT: Normocephalic, Atraumatic.  Eyes: PERRLA, EOMI, Conjunctiva normal.   Neck: Trachia is midline no swelling of the thyroid.   Lymphatic: No lymphadenopathy noted.   Neurologic: Alert & oriented x 3, cranial nerves II through XII are intact, Normal motor function, Normal sensory function, No focal deficits noted.   Psychiatric: Affect normal, Judgment normal, Mood normal.     VITAL SIGNS:/76 (BP Location: Left arm, Patient Position: Sitting, BP Cuff Size: Adult)   Pulse 77   Temp 36.6 °C (97.8 °F) (Temporal)   Ht 1.626 m (5' 4\")   Wt 114 kg (251 lb 5.2 oz)   SpO2 100%   BMI 43.14 kg/m²     Labs: Reviewed    Assessment:                                                     Plan:    1. Fatigue, unspecified type  Patient with fatigue etiology uncertain check nocturnal desaturation study  - NON SPECIFIED; Nocturnal desaturation study.  Dispense: 1 Each; Refill: 0    2. Thoracic back pain, unspecified back pain laterality, unspecified chronicity  Patient with retained forward flexion extension side bending rotation low back complaints of pain at approximately T12-L1  - DX-THORACIC SPINE-2 VIEWS; Future    3. Chronic low back pain, unspecified back pain laterality, unspecified whether sciatica present  X-rays ordered because of complaints of back pain.  - DX-LUMBAR SPINE-2 OR 3 VIEWS; Future        "

## 2022-08-22 RX ORDER — TRAZODONE HYDROCHLORIDE 50 MG/1
100 TABLET ORAL NIGHTLY
Qty: 60 TABLET | Refills: 5 | Status: SHIPPED | OUTPATIENT
Start: 2022-08-22 | End: 2023-05-26

## 2022-09-15 ENCOUNTER — APPOINTMENT (RX ONLY)
Dept: URBAN - METROPOLITAN AREA CLINIC 22 | Facility: CLINIC | Age: 42
Setting detail: DERMATOLOGY
End: 2022-09-15

## 2022-09-15 DIAGNOSIS — I83.9 ASYMPTOMATIC VARICOSE VEINS OF LOWER EXTREMITIES: ICD-10-CM

## 2022-09-15 DIAGNOSIS — Z71.89 OTHER SPECIFIED COUNSELING: ICD-10-CM

## 2022-09-15 DIAGNOSIS — D22 MELANOCYTIC NEVI: ICD-10-CM

## 2022-09-15 DIAGNOSIS — L82.1 OTHER SEBORRHEIC KERATOSIS: ICD-10-CM

## 2022-09-15 DIAGNOSIS — L81.4 OTHER MELANIN HYPERPIGMENTATION: ICD-10-CM

## 2022-09-15 DIAGNOSIS — L72.0 EPIDERMAL CYST: ICD-10-CM

## 2022-09-15 DIAGNOSIS — D18.0 HEMANGIOMA: ICD-10-CM

## 2022-09-15 DIAGNOSIS — L91.8 OTHER HYPERTROPHIC DISORDERS OF THE SKIN: ICD-10-CM

## 2022-09-15 PROBLEM — I83.92 ASYMPTOMATIC VARICOSE VEINS OF LEFT LOWER EXTREMITY: Status: ACTIVE | Noted: 2022-09-15

## 2022-09-15 PROBLEM — D22.5 MELANOCYTIC NEVI OF TRUNK: Status: ACTIVE | Noted: 2022-09-15

## 2022-09-15 PROBLEM — D18.01 HEMANGIOMA OF SKIN AND SUBCUTANEOUS TISSUE: Status: ACTIVE | Noted: 2022-09-15

## 2022-09-15 PROBLEM — D23.5 OTHER BENIGN NEOPLASM OF SKIN OF TRUNK: Status: ACTIVE | Noted: 2022-09-15

## 2022-09-15 PROCEDURE — ? SUNSCREEN RECOMMENDATIONS

## 2022-09-15 PROCEDURE — 99203 OFFICE O/P NEW LOW 30 MIN: CPT

## 2022-09-15 PROCEDURE — ? ADDITIONAL NOTES

## 2022-09-15 PROCEDURE — ? COUNSELING

## 2022-09-15 ASSESSMENT — LOCATION SIMPLE DESCRIPTION DERM
LOCATION SIMPLE: LEFT FOREARM
LOCATION SIMPLE: LEFT CHEEK
LOCATION SIMPLE: LEFT AXILLARY VAULT
LOCATION SIMPLE: LEFT CALF
LOCATION SIMPLE: ABDOMEN
LOCATION SIMPLE: RIGHT AXILLARY VAULT
LOCATION SIMPLE: LEFT UPPER BACK
LOCATION SIMPLE: RIGHT UPPER BACK

## 2022-09-15 ASSESSMENT — LOCATION DETAILED DESCRIPTION DERM
LOCATION DETAILED: LEFT INFERIOR UPPER BACK
LOCATION DETAILED: LEFT CENTRAL MALAR CHEEK
LOCATION DETAILED: RIGHT MID-UPPER BACK
LOCATION DETAILED: RIGHT AXILLARY VAULT
LOCATION DETAILED: LEFT PROXIMAL DORSAL FOREARM
LOCATION DETAILED: LEFT PROXIMAL CALF
LOCATION DETAILED: LEFT LATERAL ABDOMEN
LOCATION DETAILED: LEFT AXILLARY VAULT

## 2022-09-15 ASSESSMENT — LOCATION ZONE DERM
LOCATION ZONE: ARM
LOCATION ZONE: FACE
LOCATION ZONE: LEG
LOCATION ZONE: TRUNK
LOCATION ZONE: AXILLAE

## 2022-09-15 NOTE — PROCEDURE: ADDITIONAL NOTES
Render Risk Assessment In Note?: no
Detail Level: Simple
Additional Notes: Evaluated and referred to skin tag clinic
Additional Notes: Cosmetic card provided to patient

## 2022-10-03 ENCOUNTER — HOSPITAL ENCOUNTER (OUTPATIENT)
Facility: MEDICAL CENTER | Age: 42
End: 2022-10-03
Attending: PHYSICIAN ASSISTANT
Payer: COMMERCIAL

## 2022-10-03 ENCOUNTER — OFFICE VISIT (OUTPATIENT)
Dept: URGENT CARE | Facility: CLINIC | Age: 42
End: 2022-10-03
Payer: COMMERCIAL

## 2022-10-03 VITALS
RESPIRATION RATE: 14 BRPM | BODY MASS INDEX: 42.03 KG/M2 | HEIGHT: 64 IN | DIASTOLIC BLOOD PRESSURE: 80 MMHG | HEART RATE: 85 BPM | SYSTOLIC BLOOD PRESSURE: 122 MMHG | WEIGHT: 246.2 LBS | TEMPERATURE: 97.6 F | OXYGEN SATURATION: 97 %

## 2022-10-03 DIAGNOSIS — R10.2 PELVIC CRAMPING: ICD-10-CM

## 2022-10-03 LAB
APPEARANCE UR: CLEAR
BILIRUB UR STRIP-MCNC: NEGATIVE MG/DL
CANDIDA DNA VAG QL PROBE+SIG AMP: NEGATIVE
COLOR UR AUTO: YELLOW
G VAGINALIS DNA VAG QL PROBE+SIG AMP: NEGATIVE
GLUCOSE UR STRIP.AUTO-MCNC: NEGATIVE MG/DL
KETONES UR STRIP.AUTO-MCNC: NEGATIVE MG/DL
LEUKOCYTE ESTERASE UR QL STRIP.AUTO: NEGATIVE
NITRITE UR QL STRIP.AUTO: NEGATIVE
PH UR STRIP.AUTO: 6 [PH] (ref 5–8)
PROT UR QL STRIP: NEGATIVE MG/DL
RBC UR QL AUTO: NEGATIVE
SP GR UR STRIP.AUTO: 1.01
T VAGINALIS DNA VAG QL PROBE+SIG AMP: NEGATIVE
UROBILINOGEN UR STRIP-MCNC: 0.2 MG/DL

## 2022-10-03 PROCEDURE — 87480 CANDIDA DNA DIR PROBE: CPT

## 2022-10-03 PROCEDURE — 99213 OFFICE O/P EST LOW 20 MIN: CPT | Performed by: PHYSICIAN ASSISTANT

## 2022-10-03 PROCEDURE — 87660 TRICHOMONAS VAGIN DIR PROBE: CPT

## 2022-10-03 PROCEDURE — 87510 GARDNER VAG DNA DIR PROBE: CPT

## 2022-10-03 PROCEDURE — 81002 URINALYSIS NONAUTO W/O SCOPE: CPT | Performed by: PHYSICIAN ASSISTANT

## 2022-10-03 ASSESSMENT — FIBROSIS 4 INDEX: FIB4 SCORE: 0.84

## 2022-10-03 ASSESSMENT — ENCOUNTER SYMPTOMS
VOMITING: 0
MYALGIAS: 0
SHORTNESS OF BREATH: 0
NAUSEA: 0
EYE REDNESS: 0
COUGH: 0
HEADACHES: 0
FEVER: 0
EYE DISCHARGE: 0
ABDOMINAL PAIN: 1

## 2022-10-03 NOTE — PROGRESS NOTES
"Subjective     Jennifer Cano is a 42 y.o. female who presents with Dysmenorrhea (X 1 month with low back pain.  Pt. Thought maybe she is starting menopause. )            This is a new problem.  The patient presents to clinic complaining of pelvic cramping x1 month.  The patient states that she recently saw her OB/GYN in the beginning of August for her routine screening and yearly Pap smear.  The patient states her GYN called her and informed her that she had a vaginal yeast infection.  The patient states her GYN recommended OTC treatment.  The patient states that she did a 3-day OTC treatment for her vaginal yeast infection.  The patient states that she continued to experience some pelvic cramping and dryness to the vaginal region.  The patient states she did an additional 1 day over-the-counter treatment for a presumed vaginal yeast infection 1 week later.  The patient states she then got her menstrual period, and that her menstrual period would \"flush everything out\".  The patient states she has continued to experience pelvic cramping following her menstrual period.  The patient reports no associated discharge/drainage.  She also reports no urinary symptoms.  No dysuria.  No hematuria.  The patient believes she is premenopausal, and is unsure if the symptoms could be related to menopause.  The patient has started taking an over-the-counter daily probiotic to help with her overall gene.  The patient has not taken any additional over-the-counter medications.    Dysmenorrhea  Associated symptoms include abdominal pain and congestion (secondary to allergies). Pertinent negatives include no chest pain, coughing, fever, headaches, myalgias, nausea or vomiting.       PMH:  has a past medical history of ASTHMA (6/1/2009), Class 1 obesity in adult (2/1/2019), Dyslipidemia (1/22/2019), Migraines, neuralgic (6/1/2009), and Osteopenia (2/8/2016).  MEDS:   Current Outpatient Medications:     cholecalciferol (VITAMIN " "D3) 400 UNIT Tab, Take 400 Units by mouth every day., Disp: , Rfl:     Multiple Minerals-Vitamins (CALCIUM CITRATE PLUS) Tab, Take  by mouth., Disp: , Rfl:     magnesium gluconate (MAG-G) 500 MG tablet, Take 500 mg by mouth 3 times a day., Disp: , Rfl:     traZODone (DESYREL) 50 MG Tab, Take 2 Tablets by mouth every evening. (Patient not taking: Reported on 10/3/2022), Disp: 60 Tablet, Rfl: 5    NON SPECIFIED, Nocturnal desaturation study. (Patient not taking: Reported on 10/3/2022), Disp: 1 Each, Rfl: 0    ibuprofen (MOTRIN) 800 MG Tab, Take 800 mg by mouth every 8 hours as needed. (Patient not taking: Reported on 10/3/2022), Disp: , Rfl:     Multiple Vitamins-Minerals (MULTIVITAMIN & MINERAL PO), Take  by mouth. (Patient not taking: Reported on 10/3/2022), Disp: , Rfl:   ALLERGIES:   Allergies   Allergen Reactions    Neomycin-Polymyxin B Rash     SURGHX:   Past Surgical History:   Procedure Laterality Date    PRIMARY C SECTION       SOCHX:  reports that she has never smoked. She has never used smokeless tobacco. She reports current alcohol use. She reports current drug use. Drug: Marijuana.  FH: Family history was reviewed, no pertinent findings to report    Review of Systems   Constitutional:  Negative for fever.   HENT:  Positive for congestion (secondary to allergies).    Eyes:  Negative for discharge and redness.   Respiratory:  Negative for cough and shortness of breath.    Cardiovascular:  Negative for chest pain.   Gastrointestinal:  Positive for abdominal pain. Negative for nausea and vomiting.   Genitourinary:  Negative for dysuria and hematuria.   Musculoskeletal:  Negative for myalgias.   Neurological:  Negative for headaches.            Objective     /80   Pulse 85   Temp 36.4 °C (97.6 °F) (Temporal)   Resp 14   Ht 1.626 m (5' 4\")   Wt 112 kg (246 lb 3.2 oz)   SpO2 97%   BMI 42.26 kg/m²      Physical Exam  Constitutional:       General: She is not in acute distress.     Appearance: Normal " appearance. She is well-developed. She is not ill-appearing.   HENT:      Head: Normocephalic and atraumatic.      Right Ear: External ear normal.      Left Ear: External ear normal.   Eyes:      Extraocular Movements: Extraocular movements intact.      Conjunctiva/sclera: Conjunctivae normal.   Cardiovascular:      Rate and Rhythm: Normal rate and regular rhythm.      Heart sounds: Normal heart sounds.   Pulmonary:      Effort: Pulmonary effort is normal. No respiratory distress.      Breath sounds: Normal breath sounds. No wheezing.   Abdominal:      Palpations: Abdomen is soft.      Tenderness: There is no abdominal tenderness. There is no right CVA tenderness or left CVA tenderness.   Genitourinary:     Comments: Exam deferred.  Musculoskeletal:         General: Normal range of motion.      Cervical back: Normal range of motion and neck supple.   Skin:     General: Skin is warm and dry.   Neurological:      Mental Status: She is alert and oriented to person, place, and time.             Progress:  Results for orders placed or performed in visit on 10/03/22   POCT Urinalysis   Result Value Ref Range    POC Color Yellow Negative    POC Appearance Clear Negative    POC Leukocyte Esterase Negative Negative    POC Nitrites Negative Negative    POC Urobiligen 0.2 Negative (0.2) mg/dL    POC Protein Negative Negative mg/dL    POC Urine PH 6.0 5.0 - 8.0    POC Blood Negative Negative    POC Specific Gravity 1.010 <1.005 - >1.030    POC Ketones Negative Negative mg/dL    POC Bilirubin Negative Negative mg/dL    POC Glucose Negative Negative mg/dL       Vaginal Pathogens - pending               Assessment & Plan        1. Pelvic cramping  - POCT Urinalysis  - VAGINAL PATHOGENS DNA PANEL; Future    The patient's presenting symptoms and physical exam endings are consistent with pelvic pain cramping.  The patient states she has been experiencing persistent pelvic cramping over the past month.  The patient was recently  diagnosed with a vaginal yeast infection.  The patient is concerned that she may be experiencing a persistent yeast infection and/or a possible urinary tract infection.  The patient's physical exam today in clinic was normal.  The patient's abdomen was soft and nontender.  No CVA tenderness was appreciated.  The patient is nontoxic and appears in no acute distress.  The patient's vital signs are stable and within normal limits.  She is afebrile today in clinic.  The patient's POCT urinalysis today in clinic was normal with negative leukocyte esterase, negative nitrites, negative blood.  Based on the patient's presenting symptoms, will test the patient for the vaginal pathogens to further evaluate her current symptoms.  We will contact the patient with results of her vaginal pathogen swab, and will treat accordingly.  Advised the patient to monitor for worsening signs and/or symptoms.  Recommend OTC medications and supportive care for symptomatic management.  Recommend patient follow-up with her PCP and/or OB/GYN as needed.  Discussed return precautions with the patient, and she verbalized understanding.    Differential diagnoses, supportive care, and indications for immediate follow-up discussed with patient.   Instructed to return to clinic or nearest emergency department for any change in condition, further concerns, or worsening of symptoms.    OTC Tylenol or Motrin for fever/discomfort.  Drink plenty of fluids  Follow-up with PCP  Return to clinic or go to the ED if symptoms worsen or fail to improve, or if the patient should develop worsening/increasing pelvic cramping, vaginal dryness, abnormal vaginal discharge, abnormal vaginal bleeding, urinary symptoms, hematuria, flank pain, abdominal pain, nausea/vomiting, fever/chills, and/or any concerning symptoms.    Discussed plan with the patient, and she agrees to the above.     I personally reviewed prior external notes and test results pertinent to today's  visit.  I have independently reviewed and interpreted all diagnostics ordered during this urgent care visit.     Please note that this dictation was created using voice recognition software. I have made every reasonable attempt to correct obvious errors, but I expect that there may be errors of grammar and possibly content that I did not discover before finalizing the note.     This note was electronically signed by Jamia Summers PA-C,

## 2022-10-21 ENCOUNTER — APPOINTMENT (RX ONLY)
Dept: URBAN - METROPOLITAN AREA CLINIC 22 | Facility: CLINIC | Age: 42
Setting detail: DERMATOLOGY
End: 2022-10-21

## 2022-10-21 DIAGNOSIS — D18.0 HEMANGIOMA: ICD-10-CM

## 2022-10-21 PROBLEM — D18.01 HEMANGIOMA OF SKIN AND SUBCUTANEOUS TISSUE: Status: ACTIVE | Noted: 2022-10-21

## 2022-10-21 PROCEDURE — ? ELECTRODESICCATION (COSMETIC)

## 2022-10-21 ASSESSMENT — LOCATION ZONE DERM: LOCATION ZONE: LEG

## 2022-10-21 ASSESSMENT — LOCATION DETAILED DESCRIPTION DERM: LOCATION DETAILED: LEFT DISTAL POSTERIOR THIGH

## 2022-10-21 ASSESSMENT — LOCATION SIMPLE DESCRIPTION DERM: LOCATION SIMPLE: LEFT POSTERIOR THIGH

## 2022-10-21 NOTE — PROCEDURE: ELECTRODESICCATION (COSMETIC)
Francis: 0.6
Post-Care Instructions: I reviewed with the patient in detail post-care instructions. Patient is to wear sunprotection, and avoid picking at any of the treated lesions. Pt may apply Vaseline to crusted or scabbing areas
Detail Level: Zone
Price (Use Numbers Only, No Special Characters Or $): 50
Anesthesia Type: 0.05% lidocaine without epinephrine
Anesthesia Volume In Cc: 0.4
Consent: The patient's consent was obtained including but not limited to risks of crusting, scabbing, blistering, scarring, darker or lighter pigmentary change, recurrence, incomplete removal and infection.

## 2023-05-10 ENCOUNTER — APPOINTMENT (OUTPATIENT)
Dept: ADMISSIONS | Facility: MEDICAL CENTER | Age: 43
End: 2023-05-10
Attending: OBSTETRICS & GYNECOLOGY
Payer: COMMERCIAL

## 2023-05-17 ENCOUNTER — TELEMEDICINE (OUTPATIENT)
Dept: MEDICAL GROUP | Facility: LAB | Age: 43
End: 2023-05-17
Payer: COMMERCIAL

## 2023-05-17 DIAGNOSIS — R10.2 PELVIC PAIN: ICD-10-CM

## 2023-05-17 DIAGNOSIS — M54.50 CHRONIC LEFT-SIDED LOW BACK PAIN WITHOUT SCIATICA: ICD-10-CM

## 2023-05-17 DIAGNOSIS — G89.29 CHRONIC LEFT-SIDED LOW BACK PAIN WITHOUT SCIATICA: ICD-10-CM

## 2023-05-17 PROCEDURE — 99213 OFFICE O/P EST LOW 20 MIN: CPT | Mod: 95 | Performed by: INTERNAL MEDICINE

## 2023-05-17 NOTE — PROGRESS NOTES
Telemedicine Video Visit: Established Patient   This Remote Face to Face encounter was conducted via Zoom. Given the importance of social distancing and other strategies recommended to reduce the risk of COVID-19 transmission, I am providing medical care to this patient via audio/video visit in place of an in person visit at the request of the patient. Verbal consent to telehealth, risks, benefits, and consequences were discussed. Patient retains the right to withdraw at any time. All existing confidentiality protections apply. The patient has access to all transmitted medical information. No dissemination of any patient images or information to other entities without further written consent.  Subjective:     Chief Complaint   Patient presents with    Follow-Up     Pt has upcoming gyn surgery       Jennifer Cano is a 43 y.o. female presenting for evaluation and management of:    Low back pain left Intensity at worst 5/10, quality achey, radiation non, associated signs and symptoms raghu, time component doesnt matter worse with working out, improved with rest, worse with working out. Two year history    Additionally patient is having gynecological surgery in approximately 2 weeks partial hysterectomy.  This to be done at [facility]    ROS    Denies any recent fevers or chills. No nausea or vomiting. No chest pains or shortness of breath.     Allergies   Allergen Reactions    Neomycin-Polymyxin B Rash       Current medicines (including changes today)  Current Outpatient Medications   Medication Sig Dispense Refill    traZODone (DESYREL) 50 MG Tab Take 2 Tablets by mouth every evening. 60 Tablet 5    NON SPECIFIED Nocturnal desaturation study. 1 Each 0     No current facility-administered medications for this visit.       Patient Active Problem List    Diagnosis Date Noted    Class 1 obesity in adult 02/01/2019    Dyslipidemia 01/22/2019    Obesity (BMI 35.0-39.9 without comorbidity) ([facility]) 01/06/2018     Osteopenia 02/08/2016    Migraines, neuralgic 06/01/2009    Asthma, well controlled 06/01/2009       Family History   Problem Relation Age of Onset    Hypertension Mother        She  has a past medical history of ASTHMA (6/1/2009), Class 1 obesity in adult (2/1/2019), Dyslipidemia (1/22/2019), Migraines, neuralgic (6/1/2009), and Osteopenia (2/8/2016).  She  has a past surgical history that includes primary c section.       Objective:   Vitals obtained by patient:  There were no vitals taken for this visit.    Physical Exam:   Constitutional: Alert, no distress, well-groomed.  Skin: No rashes in visible areas.  Eye: Round. Conjunctiva clear, lids normal. No icterus.   ENMT: Lips pink without lesions, good dentition, moist mucous membranes. Phonation normal.  Neck: No masses, no thyromegaly. Moves freely without pain.  CV: Pulse as reported by patient  Respiratory: Unlabored respiratory effort, no cough or audible wheeze  Psych: Alert and oriented x3, normal affect and mood.       Assessment and Plan:   The following treatment plan was discussed:     1. Chronic left-sided low back pain without sciatica  - DX-LUMBAR SPINE-2 OR 3 VIEWS; Future  - MR-LUMBAR SPINE-W/O; Future    2. Pelvic pain  - CBC WITH DIFFERENTIAL; Future  - Comp Metabolic Panel; Future        Follow-up: No follow-ups on file.    Face to Face Video Visit:   I spent 25 minutes with patient/guardian and I conducted this visit with audio and video present.  Jacob Ya D.O.     This evaluation was conducted via Zoom using secure and encrypted videoconferencing technology. The patient was at her employment in the [employer].  The patient's identity was confirmed and verbal consent was obtained for this virtual visit.

## 2023-05-17 NOTE — PROGRESS NOTES
Low back pain left Intensity at worst 5/10, quality achey, radiation non, associated signs and symptoms raghu, time component doesnt matter worse with working out, improved with rest, worse with working out. Two year history

## 2023-05-26 ENCOUNTER — PRE-ADMISSION TESTING (OUTPATIENT)
Dept: ADMISSIONS | Facility: MEDICAL CENTER | Age: 43
End: 2023-05-26
Attending: OBSTETRICS & GYNECOLOGY
Payer: COMMERCIAL

## 2023-05-26 VITALS — HEIGHT: 64 IN | BODY MASS INDEX: 37.56 KG/M2 | WEIGHT: 220 LBS

## 2023-06-10 ENCOUNTER — APPOINTMENT (OUTPATIENT)
Dept: RADIOLOGY | Facility: IMAGING CENTER | Age: 43
End: 2023-06-10
Attending: INTERNAL MEDICINE
Payer: COMMERCIAL

## 2023-06-10 DIAGNOSIS — G89.29 CHRONIC LEFT-SIDED LOW BACK PAIN WITHOUT SCIATICA: ICD-10-CM

## 2023-06-10 DIAGNOSIS — M54.50 CHRONIC LEFT-SIDED LOW BACK PAIN WITHOUT SCIATICA: ICD-10-CM

## 2023-06-10 PROCEDURE — 72100 X-RAY EXAM L-S SPINE 2/3 VWS: CPT | Mod: TC | Performed by: RADIOLOGY

## 2023-06-15 NOTE — H&P
DATE OF ADMISSION:  2023     OB/GYN PREOPERATIVE HISTORY AND PHYSICAL     IDENTIFICATION:  This is a 43-year-old  3, para 2-0-1-2 with last   menstrual period of current who presents with a chief complaint of heavy   painful menses.     HISTORY OF PRESENT ILLNESS:  This is a patient of mine who has had a previous   endometrial ablation as well as a bilateral salpingectomy.  She states that   her menses continued to be painful and heavy despite the ablation.  She was   previously on birth control to try and control her bleeding and she continued   to get menses.  She had no periods with Depo in college.  She tried a Depo   shot and continued to bleed all the time.  She takes Motrin only.  Currently,   she is not sexually active.  She has done bearing children.  She desires   hysterectomy.  Her most recent Pap is normal.  Endometrial biopsy recently   shows no endometrial cells, but her previous endometrial biopsy prior to the   ablation was normal.  She states that she has significant pelvic pressure and   discomfort.  She has no other complaints.  Denies nausea, vomiting, fever,   chills, change in bowel or bladder habits.  She had an ultrasound performed in   February of this year that shows enlarged inhomogeneous uterus with no   obvious masses.  Endometrial lining is 11.4 mm with well-defined margins.  She   has a 1.2 and 8 mm  depth of scar.  Normal appearing left ovary,   right ovary also appears normal.  Uterus measures 11.7x6.5x7.0.  She has no   other complaints and after careful counseling would like to move forward with   hysterectomy at this time.     OBSTETRICAL HISTORY:  Significant for 2 prior  sections and   spontaneous .     GYNECOLOGIC HISTORY:  Most recent Pap normal, history of endometrial ablation   in 2017, history of laparoscopic bilateral salpingectomy in 2017.     ALLERGIES:  None.     CURRENT MEDICATIONS:  Motrin.     MEDICAL PROBLEMS:  History of PCOS  and history of migraine headaches.     SURGICAL HISTORY:   x2, endometrial ablation, and bilateral   salpingectomy.     SOCIAL HISTORY:  She is a teacher.  She denies alcohol, tobacco, or drug   abuse.     FAMILY HISTORY:  Noncontributory.     REVIEW OF SYSTEMS:  Times 12 is negative per AMA standards available in the   chart.     LABORATORY DATA:  Pending.     PHYSICAL EXAMINATION:    VITAL SIGNS:  The patient is afebrile.  Vital signs within normal limits.  GENERAL:  She is awake, alert, in no apparent distress.  NECK:  Supple.  HEART:  Regular.  CHEST:  Clear.  BREASTS:  Symmetrical.  ABDOMEN:  Soft, pannus, nontender, positive bowel sounds x4.  EXTREMITIES:  No cyanosis, clubbing, or edema.  GYNECOLOGIC:  EGBUS within normal limits.  No perineal lesions.  Vagina is   pink and moist.  Cervix is midline without discharge or cervical motion   tenderness.  Uterus midline, anteverted, no adnexal masses.     ASSESSMENT:  At this time:  1.  Menorrhagia, dysmenorrhea, probable adenomyosis.  2.  Desires hysterectomy.  3.  Prior endometrial ablation, prior bilateral salpingectomy.     PLAN: At this time, the patient has been extensively counseled on risks,   benefits, complications, and alternatives to surgery, which include but are   not limited to risk of anesthesia, risk of injury to bowel, bladder, ureters,   major vessels, nerves, pelvis, risk of blood clots in legs and lungs,   subsequent postop pneumonia.  She understands by removing her uterus, she will   no longer have periods or be able to bear children.  She also understands   that we are in fact leaving her ovaries behind that she will continue to have   a risk for ovarian cancer in the future, but this will be decreased because   she has had her tubes previously removed but there may be tubal remnants.  Plan subsequently is laparoscopic assisted vaginal hysterectomy, possible total abdominal hysterectomy with   possible bilateral salpingectomy of  remaining tubes, cystoscopy.         ______________________________  MD UTE Jennings/MELISSA    DD:  06/15/2023 13:04  DT:  06/15/2023 14:21    Job#:  [id number]

## 2023-06-16 ENCOUNTER — PRE-ADMISSION TESTING (OUTPATIENT)
Dept: ADMISSIONS | Facility: MEDICAL CENTER | Age: 43
End: 2023-06-16
Attending: OBSTETRICS & GYNECOLOGY
Payer: COMMERCIAL

## 2023-06-16 DIAGNOSIS — Z01.812 PRE-PROCEDURAL LABORATORY EXAMINATION: ICD-10-CM

## 2023-06-16 DIAGNOSIS — Z01.810 PRE-OPERATIVE CARDIOVASCULAR EXAMINATION: ICD-10-CM

## 2023-06-16 DIAGNOSIS — R10.2 PELVIC PAIN: ICD-10-CM

## 2023-06-16 LAB
ABO GROUP BLD: NORMAL
ALBUMIN SERPL BCP-MCNC: 4 G/DL (ref 3.2–4.9)
ALBUMIN/GLOB SERPL: 1.7 G/DL
ALP SERPL-CCNC: 84 U/L (ref 30–99)
ALT SERPL-CCNC: 18 U/L (ref 2–50)
ANION GAP SERPL CALC-SCNC: 13 MMOL/L (ref 7–16)
APPEARANCE UR: CLEAR
AST SERPL-CCNC: 8 U/L (ref 12–45)
B-HCG SERPL-ACNC: <1 MIU/ML (ref 0–5)
BASOPHILS # BLD AUTO: 0.2 % (ref 0–1.8)
BASOPHILS # BLD: 0.02 K/UL (ref 0–0.12)
BILIRUB SERPL-MCNC: 0.7 MG/DL (ref 0.1–1.5)
BILIRUB UR QL STRIP.AUTO: NEGATIVE
BLD GP AB SCN SERPL QL: NORMAL
BUN SERPL-MCNC: 12 MG/DL (ref 8–22)
CALCIUM ALBUM COR SERPL-MCNC: 8.8 MG/DL (ref 8.5–10.5)
CALCIUM SERPL-MCNC: 8.8 MG/DL (ref 8.5–10.5)
CHLORIDE SERPL-SCNC: 105 MMOL/L (ref 96–112)
CO2 SERPL-SCNC: 20 MMOL/L (ref 20–33)
COLOR UR: YELLOW
CREAT SERPL-MCNC: 0.74 MG/DL (ref 0.5–1.4)
EKG IMPRESSION: NORMAL
EOSINOPHIL # BLD AUTO: 0.18 K/UL (ref 0–0.51)
EOSINOPHIL NFR BLD: 2.2 % (ref 0–6.9)
ERYTHROCYTE [DISTWIDTH] IN BLOOD BY AUTOMATED COUNT: 42.4 FL (ref 35.9–50)
GFR SERPLBLD CREATININE-BSD FMLA CKD-EPI: 103 ML/MIN/1.73 M 2
GLOBULIN SER CALC-MCNC: 2.4 G/DL (ref 1.9–3.5)
GLUCOSE SERPL-MCNC: 164 MG/DL (ref 65–99)
GLUCOSE UR STRIP.AUTO-MCNC: NEGATIVE MG/DL
HCT VFR BLD AUTO: 41.2 % (ref 37–47)
HGB BLD-MCNC: 13.4 G/DL (ref 12–16)
IMM GRANULOCYTES # BLD AUTO: 0.03 K/UL (ref 0–0.11)
IMM GRANULOCYTES NFR BLD AUTO: 0.4 % (ref 0–0.9)
KETONES UR STRIP.AUTO-MCNC: NEGATIVE MG/DL
LEUKOCYTE ESTERASE UR QL STRIP.AUTO: NEGATIVE
LYMPHOCYTES # BLD AUTO: 2.37 K/UL (ref 1–4.8)
LYMPHOCYTES NFR BLD: 28.6 % (ref 22–41)
MCH RBC QN AUTO: 29.4 PG (ref 27–33)
MCHC RBC AUTO-ENTMCNC: 32.5 G/DL (ref 32.2–35.5)
MCV RBC AUTO: 90.4 FL (ref 81.4–97.8)
MICRO URNS: NORMAL
MONOCYTES # BLD AUTO: 0.38 K/UL (ref 0–0.85)
MONOCYTES NFR BLD AUTO: 4.6 % (ref 0–13.4)
NEUTROPHILS # BLD AUTO: 5.32 K/UL (ref 1.82–7.42)
NEUTROPHILS NFR BLD: 64 % (ref 44–72)
NITRITE UR QL STRIP.AUTO: NEGATIVE
NRBC # BLD AUTO: 0 K/UL
NRBC BLD-RTO: 0 /100 WBC (ref 0–0.2)
PH UR STRIP.AUTO: 6 [PH] (ref 5–8)
PLATELET # BLD AUTO: 228 K/UL (ref 164–446)
PMV BLD AUTO: 10 FL (ref 9–12.9)
POTASSIUM SERPL-SCNC: 3.8 MMOL/L (ref 3.6–5.5)
PROT SERPL-MCNC: 6.4 G/DL (ref 6–8.2)
PROT UR QL STRIP: NEGATIVE MG/DL
RBC # BLD AUTO: 4.56 M/UL (ref 4.2–5.4)
RBC UR QL AUTO: NEGATIVE
RH BLD: NORMAL
SODIUM SERPL-SCNC: 138 MMOL/L (ref 135–145)
SP GR UR STRIP.AUTO: 1.01
UROBILINOGEN UR STRIP.AUTO-MCNC: 0.2 MG/DL
WBC # BLD AUTO: 8.3 K/UL (ref 4.8–10.8)

## 2023-06-16 PROCEDURE — 93005 ELECTROCARDIOGRAM TRACING: CPT

## 2023-06-16 PROCEDURE — 86901 BLOOD TYPING SEROLOGIC RH(D): CPT

## 2023-06-16 PROCEDURE — 85025 COMPLETE CBC W/AUTO DIFF WBC: CPT

## 2023-06-16 PROCEDURE — 80053 COMPREHEN METABOLIC PANEL: CPT

## 2023-06-16 PROCEDURE — 86850 RBC ANTIBODY SCREEN: CPT

## 2023-06-16 PROCEDURE — 81003 URINALYSIS AUTO W/O SCOPE: CPT

## 2023-06-16 PROCEDURE — 93010 ELECTROCARDIOGRAM REPORT: CPT | Performed by: INTERNAL MEDICINE

## 2023-06-16 PROCEDURE — 36415 COLL VENOUS BLD VENIPUNCTURE: CPT

## 2023-06-16 PROCEDURE — 84702 CHORIONIC GONADOTROPIN TEST: CPT

## 2023-06-16 PROCEDURE — 86900 BLOOD TYPING SEROLOGIC ABO: CPT

## 2023-06-20 ENCOUNTER — HOSPITAL ENCOUNTER (OUTPATIENT)
Facility: MEDICAL CENTER | Age: 43
End: 2023-06-20
Attending: OBSTETRICS & GYNECOLOGY | Admitting: OBSTETRICS & GYNECOLOGY
Payer: COMMERCIAL

## 2023-06-20 ENCOUNTER — ANESTHESIA EVENT (OUTPATIENT)
Dept: SURGERY | Facility: MEDICAL CENTER | Age: 43
End: 2023-06-20
Payer: COMMERCIAL

## 2023-06-20 ENCOUNTER — ANESTHESIA (OUTPATIENT)
Dept: SURGERY | Facility: MEDICAL CENTER | Age: 43
End: 2023-06-20
Payer: COMMERCIAL

## 2023-06-20 VITALS
RESPIRATION RATE: 18 BRPM | HEART RATE: 84 BPM | DIASTOLIC BLOOD PRESSURE: 68 MMHG | TEMPERATURE: 98.4 F | OXYGEN SATURATION: 92 % | HEIGHT: 64 IN | SYSTOLIC BLOOD PRESSURE: 123 MMHG | WEIGHT: 240.74 LBS | BODY MASS INDEX: 41.1 KG/M2

## 2023-06-20 DIAGNOSIS — R11.0 NAUSEA AFTER ANESTHESIA, SEQUELA: ICD-10-CM

## 2023-06-20 DIAGNOSIS — T88.59XS NAUSEA AFTER ANESTHESIA, SEQUELA: ICD-10-CM

## 2023-06-20 LAB
ABO + RH BLD: NORMAL
HCG UR QL: NEGATIVE
PATHOLOGY CONSULT NOTE: NORMAL

## 2023-06-20 PROCEDURE — 160048 HCHG OR STATISTICAL LEVEL 1-5: Performed by: OBSTETRICS & GYNECOLOGY

## 2023-06-20 PROCEDURE — 160002 HCHG RECOVERY MINUTES (STAT): Performed by: OBSTETRICS & GYNECOLOGY

## 2023-06-20 PROCEDURE — 160025 RECOVERY II MINUTES (STATS): Performed by: OBSTETRICS & GYNECOLOGY

## 2023-06-20 PROCEDURE — 700101 HCHG RX REV CODE 250: Performed by: OBSTETRICS & GYNECOLOGY

## 2023-06-20 PROCEDURE — 160029 HCHG SURGERY MINUTES - 1ST 30 MINS LEVEL 4: Performed by: OBSTETRICS & GYNECOLOGY

## 2023-06-20 PROCEDURE — 700102 HCHG RX REV CODE 250 W/ 637 OVERRIDE(OP): Performed by: ANESTHESIOLOGY

## 2023-06-20 PROCEDURE — 700111 HCHG RX REV CODE 636 W/ 250 OVERRIDE (IP): Performed by: ANESTHESIOLOGY

## 2023-06-20 PROCEDURE — 160035 HCHG PACU - 1ST 60 MINS PHASE I: Performed by: OBSTETRICS & GYNECOLOGY

## 2023-06-20 PROCEDURE — 00944 ANES VAG PX VAG HYSTERECTOMY: CPT | Performed by: ANESTHESIOLOGY

## 2023-06-20 PROCEDURE — 160047 HCHG PACU  - EA ADDL 30 MINS PHASE II: Performed by: OBSTETRICS & GYNECOLOGY

## 2023-06-20 PROCEDURE — 160041 HCHG SURGERY MINUTES - EA ADDL 1 MIN LEVEL 4: Performed by: OBSTETRICS & GYNECOLOGY

## 2023-06-20 PROCEDURE — 160009 HCHG ANES TIME/MIN: Performed by: OBSTETRICS & GYNECOLOGY

## 2023-06-20 PROCEDURE — 88307 TISSUE EXAM BY PATHOLOGIST: CPT

## 2023-06-20 PROCEDURE — A9270 NON-COVERED ITEM OR SERVICE: HCPCS | Performed by: ANESTHESIOLOGY

## 2023-06-20 PROCEDURE — 700104 HCHG RX REV CODE 254: Performed by: ANESTHESIOLOGY

## 2023-06-20 PROCEDURE — 36415 COLL VENOUS BLD VENIPUNCTURE: CPT

## 2023-06-20 PROCEDURE — 700101 HCHG RX REV CODE 250: Performed by: ANESTHESIOLOGY

## 2023-06-20 PROCEDURE — 160046 HCHG PACU - 1ST 60 MINS PHASE II: Performed by: OBSTETRICS & GYNECOLOGY

## 2023-06-20 PROCEDURE — 700105 HCHG RX REV CODE 258: Performed by: OBSTETRICS & GYNECOLOGY

## 2023-06-20 PROCEDURE — 81025 URINE PREGNANCY TEST: CPT

## 2023-06-20 RX ORDER — HYDROMORPHONE HYDROCHLORIDE 1 MG/ML
0.1 INJECTION, SOLUTION INTRAMUSCULAR; INTRAVENOUS; SUBCUTANEOUS
Status: DISCONTINUED | OUTPATIENT
Start: 2023-06-20 | End: 2023-06-20 | Stop reason: HOSPADM

## 2023-06-20 RX ORDER — PSEUDOEPHEDRINE HCL 30 MG
60 TABLET ORAL
Status: ON HOLD | COMMUNITY
End: 2023-06-20

## 2023-06-20 RX ORDER — IBUPROFEN 600 MG/1
600 TABLET ORAL EVERY 6 HOURS PRN
Status: ON HOLD | COMMUNITY
End: 2023-06-20

## 2023-06-20 RX ORDER — HYDROMORPHONE HYDROCHLORIDE 1 MG/ML
0.4 INJECTION, SOLUTION INTRAMUSCULAR; INTRAVENOUS; SUBCUTANEOUS
Status: DISCONTINUED | OUTPATIENT
Start: 2023-06-20 | End: 2023-06-20 | Stop reason: HOSPADM

## 2023-06-20 RX ORDER — MIDAZOLAM HYDROCHLORIDE 1 MG/ML
INJECTION INTRAMUSCULAR; INTRAVENOUS PRN
Status: DISCONTINUED | OUTPATIENT
Start: 2023-06-20 | End: 2023-06-20 | Stop reason: SURG

## 2023-06-20 RX ORDER — DIPHENHYDRAMINE HYDROCHLORIDE 50 MG/ML
12.5 INJECTION INTRAMUSCULAR; INTRAVENOUS
Status: DISCONTINUED | OUTPATIENT
Start: 2023-06-20 | End: 2023-06-20 | Stop reason: HOSPADM

## 2023-06-20 RX ORDER — BUPIVACAINE HYDROCHLORIDE AND EPINEPHRINE 2.5; 5 MG/ML; UG/ML
INJECTION, SOLUTION EPIDURAL; INFILTRATION; INTRACAUDAL; PERINEURAL
Status: DISCONTINUED | OUTPATIENT
Start: 2023-06-20 | End: 2023-06-20 | Stop reason: HOSPADM

## 2023-06-20 RX ORDER — DEXAMETHASONE SODIUM PHOSPHATE 4 MG/ML
INJECTION, SOLUTION INTRA-ARTICULAR; INTRALESIONAL; INTRAMUSCULAR; INTRAVENOUS; SOFT TISSUE PRN
Status: DISCONTINUED | OUTPATIENT
Start: 2023-06-20 | End: 2023-06-20 | Stop reason: SURG

## 2023-06-20 RX ORDER — MEPERIDINE HYDROCHLORIDE 25 MG/ML
6.25 INJECTION INTRAMUSCULAR; INTRAVENOUS; SUBCUTANEOUS
Status: DISCONTINUED | OUTPATIENT
Start: 2023-06-20 | End: 2023-06-20 | Stop reason: HOSPADM

## 2023-06-20 RX ORDER — ROCURONIUM BROMIDE 10 MG/ML
INJECTION, SOLUTION INTRAVENOUS PRN
Status: DISCONTINUED | OUTPATIENT
Start: 2023-06-20 | End: 2023-06-20 | Stop reason: SURG

## 2023-06-20 RX ORDER — ONDANSETRON 2 MG/ML
INJECTION INTRAMUSCULAR; INTRAVENOUS PRN
Status: DISCONTINUED | OUTPATIENT
Start: 2023-06-20 | End: 2023-06-20 | Stop reason: SURG

## 2023-06-20 RX ORDER — BUPIVACAINE HYDROCHLORIDE 2.5 MG/ML
INJECTION, SOLUTION EPIDURAL; INFILTRATION; INTRACAUDAL
Status: DISCONTINUED
Start: 2023-06-20 | End: 2023-06-20 | Stop reason: HOSPADM

## 2023-06-20 RX ORDER — KETOROLAC TROMETHAMINE 30 MG/ML
30 INJECTION, SOLUTION INTRAMUSCULAR; INTRAVENOUS ONCE
Status: COMPLETED | OUTPATIENT
Start: 2023-06-20 | End: 2023-06-20

## 2023-06-20 RX ORDER — CEFAZOLIN SODIUM 1 G/3ML
INJECTION, POWDER, FOR SOLUTION INTRAMUSCULAR; INTRAVENOUS PRN
Status: DISCONTINUED | OUTPATIENT
Start: 2023-06-20 | End: 2023-06-20 | Stop reason: HOSPADM

## 2023-06-20 RX ORDER — OXYCODONE HCL 5 MG/5 ML
5 SOLUTION, ORAL ORAL
Status: COMPLETED | OUTPATIENT
Start: 2023-06-20 | End: 2023-06-20

## 2023-06-20 RX ORDER — OXYCODONE HCL 5 MG/5 ML
10 SOLUTION, ORAL ORAL
Status: COMPLETED | OUTPATIENT
Start: 2023-06-20 | End: 2023-06-20

## 2023-06-20 RX ORDER — ONDANSETRON 4 MG/1
4 TABLET, FILM COATED ORAL EVERY 4 HOURS PRN
Qty: 20 TABLET | Refills: 0 | Status: SHIPPED | OUTPATIENT
Start: 2023-06-20 | End: 2023-10-16

## 2023-06-20 RX ORDER — SODIUM CHLORIDE, SODIUM LACTATE, POTASSIUM CHLORIDE, CALCIUM CHLORIDE 600; 310; 30; 20 MG/100ML; MG/100ML; MG/100ML; MG/100ML
INJECTION, SOLUTION INTRAVENOUS CONTINUOUS
Status: ACTIVE | OUTPATIENT
Start: 2023-06-20 | End: 2023-06-20

## 2023-06-20 RX ORDER — HALOPERIDOL 5 MG/ML
1 INJECTION INTRAMUSCULAR
Status: DISCONTINUED | OUTPATIENT
Start: 2023-06-20 | End: 2023-06-20 | Stop reason: HOSPADM

## 2023-06-20 RX ORDER — SODIUM CHLORIDE, SODIUM LACTATE, POTASSIUM CHLORIDE, CALCIUM CHLORIDE 600; 310; 30; 20 MG/100ML; MG/100ML; MG/100ML; MG/100ML
INJECTION, SOLUTION INTRAVENOUS CONTINUOUS
Status: DISCONTINUED | OUTPATIENT
Start: 2023-06-20 | End: 2023-06-20 | Stop reason: HOSPADM

## 2023-06-20 RX ORDER — LIDOCAINE HYDROCHLORIDE 20 MG/ML
INJECTION, SOLUTION EPIDURAL; INFILTRATION; INTRACAUDAL; PERINEURAL PRN
Status: DISCONTINUED | OUTPATIENT
Start: 2023-06-20 | End: 2023-06-20 | Stop reason: SURG

## 2023-06-20 RX ORDER — EPINEPHRINE 1 MG/ML(1)
AMPUL (ML) INJECTION
Status: DISCONTINUED
Start: 2023-06-20 | End: 2023-06-20 | Stop reason: HOSPADM

## 2023-06-20 RX ORDER — ONDANSETRON 2 MG/ML
4 INJECTION INTRAMUSCULAR; INTRAVENOUS
Status: COMPLETED | OUTPATIENT
Start: 2023-06-20 | End: 2023-06-20

## 2023-06-20 RX ORDER — HYDROMORPHONE HYDROCHLORIDE 2 MG/ML
INJECTION, SOLUTION INTRAMUSCULAR; INTRAVENOUS; SUBCUTANEOUS PRN
Status: DISCONTINUED | OUTPATIENT
Start: 2023-06-20 | End: 2023-06-20 | Stop reason: SURG

## 2023-06-20 RX ORDER — HYDROMORPHONE HYDROCHLORIDE 1 MG/ML
0.2 INJECTION, SOLUTION INTRAMUSCULAR; INTRAVENOUS; SUBCUTANEOUS
Status: DISCONTINUED | OUTPATIENT
Start: 2023-06-20 | End: 2023-06-20 | Stop reason: HOSPADM

## 2023-06-20 RX ADMIN — DEXAMETHASONE SODIUM PHOSPHATE 8 MG: 4 INJECTION INTRA-ARTICULAR; INTRALESIONAL; INTRAMUSCULAR; INTRAVENOUS; SOFT TISSUE at 13:02

## 2023-06-20 RX ADMIN — ONDANSETRON 4 MG: 2 INJECTION INTRAMUSCULAR; INTRAVENOUS at 16:25

## 2023-06-20 RX ADMIN — OXYCODONE HYDROCHLORIDE 10 MG: 5 SOLUTION ORAL at 15:17

## 2023-06-20 RX ADMIN — INDIGOTINDISULFONATE SODIUM 5 ML: 8 INJECTION INTRAVENOUS at 14:41

## 2023-06-20 RX ADMIN — FENTANYL CITRATE 25 MCG: 50 INJECTION, SOLUTION INTRAMUSCULAR; INTRAVENOUS at 15:38

## 2023-06-20 RX ADMIN — ROCURONIUM BROMIDE 20 MG: 50 INJECTION, SOLUTION INTRAVENOUS at 14:22

## 2023-06-20 RX ADMIN — SODIUM CHLORIDE, POTASSIUM CHLORIDE, SODIUM LACTATE AND CALCIUM CHLORIDE: 600; 310; 30; 20 INJECTION, SOLUTION INTRAVENOUS at 12:48

## 2023-06-20 RX ADMIN — ONDANSETRON 4 MG: 2 INJECTION INTRAMUSCULAR; INTRAVENOUS at 14:57

## 2023-06-20 RX ADMIN — MIDAZOLAM 2 MG: 1 INJECTION, SOLUTION INTRAMUSCULAR; INTRAVENOUS at 12:48

## 2023-06-20 RX ADMIN — CEFAZOLIN 2 G: 1 INJECTION, POWDER, FOR SOLUTION INTRAMUSCULAR; INTRAVENOUS at 12:55

## 2023-06-20 RX ADMIN — ROCURONIUM BROMIDE 50 MG: 50 INJECTION, SOLUTION INTRAVENOUS at 12:51

## 2023-06-20 RX ADMIN — FENTANYL CITRATE 25 MCG: 50 INJECTION, SOLUTION INTRAMUSCULAR; INTRAVENOUS at 15:17

## 2023-06-20 RX ADMIN — KETOROLAC TROMETHAMINE 30 MG: 30 INJECTION, SOLUTION INTRAMUSCULAR at 17:16

## 2023-06-20 RX ADMIN — HYDROMORPHONE HYDROCHLORIDE 1 MCG: 2 INJECTION INTRAMUSCULAR; INTRAVENOUS; SUBCUTANEOUS at 13:57

## 2023-06-20 RX ADMIN — LIDOCAINE HYDROCHLORIDE 100 MG: 20 INJECTION, SOLUTION EPIDURAL; INFILTRATION; INTRACAUDAL at 12:51

## 2023-06-20 RX ADMIN — FENTANYL CITRATE 50 MCG: 50 INJECTION, SOLUTION INTRAMUSCULAR; INTRAVENOUS at 13:00

## 2023-06-20 RX ADMIN — ROCURONIUM BROMIDE 20 MG: 50 INJECTION, SOLUTION INTRAVENOUS at 13:55

## 2023-06-20 RX ADMIN — PROPOFOL 200 MG: 10 INJECTION, EMULSION INTRAVENOUS at 12:51

## 2023-06-20 RX ADMIN — FENTANYL CITRATE 50 MCG: 50 INJECTION, SOLUTION INTRAMUSCULAR; INTRAVENOUS at 12:51

## 2023-06-20 RX ADMIN — HYDROMORPHONE HYDROCHLORIDE 1 MCG: 2 INJECTION INTRAMUSCULAR; INTRAVENOUS; SUBCUTANEOUS at 14:22

## 2023-06-20 RX ADMIN — SUGAMMADEX 200 MG: 100 INJECTION, SOLUTION INTRAVENOUS at 14:57

## 2023-06-20 ASSESSMENT — PAIN DESCRIPTION - PAIN TYPE
TYPE: SURGICAL PAIN

## 2023-06-20 ASSESSMENT — FIBROSIS 4 INDEX: FIB4 SCORE: 0.36

## 2023-06-20 ASSESSMENT — PAIN SCALES - GENERAL: PAIN_LEVEL: 3

## 2023-06-20 NOTE — ANESTHESIA TIME REPORT
Anesthesia Start and Stop Event Times     Date Time Event    6/20/2023 1222 Ready for Procedure     1248 Anesthesia Start     1507 Anesthesia Stop        Responsible Staff  06/20/23    Name Role Begin End    Rachelle Vázquez M.D. Anesth 1248 1507        Overtime Reason:  no overtime (within assigned shift)    Comments:

## 2023-06-20 NOTE — DISCHARGE INSTRUCTIONS
HOME CARE INSTRUCTIONS    ACTIVITY: Rest and take it easy for the first 24 hours.  A responsible adult is recommended to remain with you during that time.  It is normal to feel sleepy.  We encourage you to not do anything that requires balance, judgment or coordination.    FOR 24 HOURS DO NOT:  Drive, operate machinery or run household appliances.  Drink beer or alcoholic beverages.  Make important decisions or sign legal documents.    SPECIAL INSTRUCTIONS: see handout for additional instructions   Follow up in 2 weeks   Pelvic rest and no heavy lifting    DIET: To avoid nausea, slowly advance diet as tolerated, avoiding spicy or greasy foods for the first day.  Add more substantial food to your diet according to your physician's instructions.  Babies can be fed formula or breast milk as soon as they are hungry.  INCREASE FLUIDS AND FIBER TO AVOID CONSTIPATION.    MEDICATIONS: Resume taking daily medication.  Take prescribed pain medication with food.  If no medication is prescribed, you may take non-aspirin pain medication if needed.  PAIN MEDICATION CAN BE VERY CONSTIPATING.  Take a stool softener or laxative such as senokot, pericolace, or milk of magnesia if needed.    Zofran prescription sent for nausea.  Last pain medication given oxycodone at 3:20pm.    A follow-up appointment should be arranged with your doctor; call to schedule.    You should CALL YOUR PHYSICIAN if you develop:  Fever greater than 101 degrees F.  Pain not relieved by medication, or persistent nausea or vomiting.  Excessive bleeding (blood soaking through dressing) or unexpected drainage from the wound.  Extreme redness or swelling around the incision site, drainage of pus or foul smelling drainage.  Inability to urinate or empty your bladder within 8 hours.  Problems with breathing or chest pain.    You should call 911 if you develop problems with breathing or chest pain.  If you are unable to contact your doctor or surgical center, you  should go to the nearest emergency room or urgent care center.  Physician's telephone #: Dr. Cano [phone]    MILD FLU-LIKE SYMPTOMS ARE NORMAL.  YOU MAY EXPERIENCE GENERALIZED MUSCLE ACHES, THROAT IRRITATION, HEADACHE AND/OR SOME NAUSEA.    If any questions arise, call your doctor.  If your doctor is not available, please feel free to call the Surgical Center at [phone].  The Center is open Monday through Friday from 7AM to 7PM.      A registered nurse may call you a few days after your surgery to see how you are doing after your procedure.    You may also receive a survey in the mail within the next two weeks and we ask that you take a few moments to complete the survey and return it to us.  Our goal is to provide you with very good care and we value your comments.     Depression / Suicide Risk    As you are discharged from this [facility] facility, it is important to learn how to keep safe from harming yourself.    Recognize the warning signs:  Abrupt changes in personality, positive or negative- including increase in energy   Giving away possessions  Change in eating patterns- significant weight changes-  positive or negative  Change in sleeping patterns- unable to sleep or sleeping all the time   Unwillingness or inability to communicate  Depression  Unusual sadness, discouragement and loneliness  Talk of wanting to die  Neglect of personal appearance   Rebelliousness- reckless behavior  Withdrawal from people/activities they love  Confusion- inability to concentrate     If you or a loved one observes any of these behaviors or has concerns about self-harm, here's what you can do:  Talk about it- your feelings and reasons for harming yourself  Remove any means that you might use to hurt yourself (examples: pills, rope, extension cords, firearm)  Get professional help from the community (Mental Health, Substance Abuse, psychological counseling)  Do not be alone:Call your Safe Contact- someone whom  you trust who will be there for you.  Call your local CRISIS HOTLINE 101-0272 or 456-753-7771  Call your local Children's Mobile Crisis Response Team [place] (301) 404-9106 or www.[employer]  Call the toll free National Suicide Prevention Hotlines   National Suicide Prevention Lifeline 549-862-BCZT (9663)  [facility] 800-WQQJVCZ (743-7096)    I acknowledge receipt and understanding of these Home Care instructions.

## 2023-06-20 NOTE — OR NURSING
1505 - Pt to PACU 7 from OR.  Bedside report from anesthesiologist and RN.  Attached to monitoring, VSS, breathing is calm and unlabored on 4L oxymask with oral airway in place, pt awake, airway dc'd. juan david pad in place, CDI, x2 lap sites dressed with gauze and tegaderm, CDI.     1517- Given pain medicine per MAR for reported 6/10 pain, tolerating sips of water.     1530- Called and updated pt's friend, Chetan. Pt placed on 2L oxymask for comfort.     1538- Given subsequent dose of pain medicine per MAR.     1620- Pt assisted to the bathroom, able to void. Given nausea medicine per MAR.    1640- Called pt's friend and reviewed discharge instructions over the phone. All instructions acknowledged, all questions answered. Pt tolerating fluids and able to eat crackers.     1648- Given I.S.     1705- Handoff given to NELLY Griffith. Pt resting in [facility], placed order for toradol for pain per telephone order from anesthesiologist.

## 2023-06-20 NOTE — ANESTHESIA PREPROCEDURE EVALUATION
Case: [id number] Date/Time: 06/20/23 1245    Procedure: LAPARASCOPICALLY ASSISTED VAGINAL HYSTERECTOMY WITH BILATERAL SALPINGECTOMY, BLADDER CYSTOSCOPY, POSSIBLE TOTAL ABDOMINAL HYSTERECTOMY (Vagina )    Anesthesia type: General    Pre-op diagnosis: ABNORMAL UTERINE BLEEDING, DYSMENORRHEA, ADNOMYOSIS    Location: [facility] ROOM 21 / SURGERY SAME DAY [facility]    Surgeons: Luigi Cano M.D.      42yo female with asthma, sinus infections, migraines, occ marijuana    Relevant Problems   PULMONARY   (positive) Asthma, well controlled      NEURO   (positive) Migraines, neuralgic       Physical Exam    Airway   Mallampati: I  TM distance: >3 FB  Neck ROM: full       Cardiovascular - normal exam  Rhythm: regular  Rate: normal  (-) murmur     Dental - normal exam           Pulmonary - normal exam  Breath sounds clear to auscultation     Abdominal    Neurological - normal exam                 Anesthesia Plan    ASA 2       Plan - general       Airway plan will be ETT          Induction: intravenous    Postoperative Plan: Postoperative administration of opioids is intended.    Pertinent diagnostic labs and testing reviewed    Informed Consent:    Anesthetic plan and risks discussed with patient.    Use of blood products discussed with: patient whom consented to blood products.

## 2023-06-20 NOTE — ANESTHESIA PROCEDURE NOTES
Airway    Date/Time: 6/20/2023 12:52 PM    Performed by: Rachelle Vázquez M.D.  Authorized by: Rachelle Vázquez M.D.    Location:  OR  Urgency:  Elective  Difficult Airway: No    Indications for Airway Management:  Anesthesia      Spontaneous Ventilation: absent    Sedation Level:  Deep  Preoxygenated: Yes    Patient Position:  Sniffing  Mask Difficulty Assessment:  1 - vent by mask  Final Airway Type:  Endotracheal airway  Final Endotracheal Airway:  ETT  Cuffed: Yes    Technique Used for Successful ETT Placement:  Direct laryngoscopy    Insertion Site:  Oral  Blade Type:  Shashank  Laryngoscope Blade/Videolaryngoscope Blade Size:  3  ETT Size (mm):  7.0  Measured from:  Teeth  ETT to Teeth (cm):  22  Placement Verified by: auscultation and capnometry    Cormack-Lehane Classification:  Grade I - full view of glottis  Number of Attempts at Approach:  1  Number of Other Approaches Attempted:  0

## 2023-06-20 NOTE — OR SURGEON
Immediate Post OP Note    PreOp Diagnosis: menorrhagia; dysmenorrhea; adenomyosis      PostOp Diagnosis: same      Procedure(s):  LAPARASCOPICALLY ASSISTED VAGINAL HYSTERECTOMY, BLADDER CYSTOSCOPY - Wound Class: Clean Contaminated    Surgeon(s):  MICKIE Linares M.D.    Anesthesiologist/Type of Anesthesia:  Anesthesiologist: Rachelle Vázquez M.D./General    Surgical Staff:  Circulator: Livier Calvo R.N.  Scrub Person: Ileana Dean    Specimens removed if any:  ID Type Source Tests Collected by Time Destination   A : Cervix, Uterus  Other Other PATHOLOGY SPECIMEN Luigi Cano M.D. 6/20/2023 1318        Estimated Blood Loss: 50    Findings: enlarged uterus; absent tubes; normal ovaries; peristalsis of the ureters from the pelvic brim down; intact bladder dome; efflux of indigo from the ureteral orrifices    Complications: none        6/20/2023 2:54 PM Luigi Cano M.D.

## 2023-06-21 NOTE — OR NURSING
1705: Report from NELLY Self. Pt resting in bed; on room air.     1710: Pt reports nausea and dry heaving. Haldol refused by pt at this time. Pt will try crackers and ginger ale.     1715:Toradol given per mar for pain 6/10    1735: Pt on room air.     1750: Pt up to restroom; voided and dressed. Pt vomited x1.     1800: Pt sitting in recliner chair; on room air. Pt declines additional pain and nausea medication.     1818: Pt meets discharge criteria. PIV removed intact. Pt escorted out with all personal belongings via wheelchair by RN

## 2023-06-21 NOTE — OP REPORT
DATE OF SERVICE:  06/20/2023     OBSTETRIC AND GYNECOLOGY OPERATIVE REPORT     PREOPERATIVE DIAGNOSES:  Menorrhagia, dysmenorrhea, probable adenomyosis.     POSTOPERATIVE DIAGNOSES:  Menorrhagia, dysmenorrhea, probable adenomyosis.     PROCEDURE:  Laparoscopic-assisted vaginal hysterectomy and cystoscopy.     SURGEON:  Luigi Blas MD     ASSISTANT:  Abdiaziz.     ANESTHESIOLOGIST:  Rachelle Vázquez MD     ANESTHESIA:  General.     SPECIMENS:  Uterus with cervix.     FINDINGS:    1.  Enlarged uterus weighing approximately 308 grams in the operating room.  2.  Absent tubes.  3.  Normal-appearing ovaries.  4.  Peristalsis of the ureters from the pelvic brim down pre and   post-hysterectomy.  5.  Intact bladder dome.  6.  Efflux of indigo carmine from the ureteral orifices bilaterally.     COMPLICATIONS:  None.     DISPOSITION:  Stable.     PROCEDURE IN DETAIL:  After informed consent was obtained, the patient was   taken to the operating room where general anesthesia was found to be adequate.    She was then prepped and draped in normal sterile fashion in dorsal   lithotomy position.  Montezuma speculum was placed in her vagina.  Cervix was   grasped with a single tooth tenaculum, sounded to 8 cm and then a Hulka   manipulator was placed.  All other instruments were removed.  We changed   gloves, repositioned the patient's legs, proceeded to the abdominal portion of   the case.  The operative sites were preinjected with 0.25% Marcaine with   epinephrine.  Small infraumbilical incision was made with a scalpel.  The   Veress needle was used to obtain intraperitoneal access.  This was confirmed   with a saline drop test.  Pneumoperitoneum was then obtained and the Veress   was removed.  A 12 mm port with obturator replaced, the obturator was removed   and the scope replaced it.  A 5 mm suprapubic port was then placed under   direct visualization.  At this point, the pelvis was examined thoroughly.  The   uterus was  enlarged.  The tubes were absent.  The ovaries appeared normal.    There was scarring at the vesicouterine peritoneum from her previous   C-sections and we did identify the course of the ureters from the pelvic brim   down.  Her liver appeared normal as well.  At this point, the uterus was   mobilized.  The right uteroovarian ligament was isolated and being cognizant   of the course of the ureter using the LigaSure, we grasped the utero-ovarian   ligament, coagulated and then cut it.  We then proceeded down through the   round ligament on the right side, coagulating and cutting and down through the   anterior and posterior leaf of the broad ligament, doing serial bites with   the LigaSure coagulating and cutting all the way down to the uterine vessels,   which were skeletonized and coagulated, but not cut.  We then started our   bladder flap on this side sharply with EndoShears.  There was scarring at the   vesicouterine peritoneum here from a previous  scar.  Hemostasis was   assured.  We proceeded to the left-hand side of the case where again being   cognizant of the course of the ureter, we started with the LigaSure, the left   utero-ovarian ligaments down through the round ligament to the anterior and   posterior leaf of the broad ligament, all the way down to the uterine vessels,   coagulating and cutting.  As we went all the way down to the uterine vessel,   which was skeletonized and coagulated, but not cut, we then completed our   bladder flap sharply on this side.  Again, irrigation was performed.    Hemostasis was assured.  We noted the course of the ureters and again saw   peristalsis from the pelvic brim down.  Gas was allowed to escape from the   pelvis and we proceeded to the vaginal portion of the case after our   laparoscopic instruments were stood.  The patient's legs were repositioned.  A   short weighted speculum was placed in the vagina.  A Arnold retractor was   placed anteriorly.  At  this point, we grasped the patient's cervix with a   Caro tenaculum and then circumferentially anesthetized the cervix with 0.25%   Marcaine with epinephrine.  At this point, we curved all the way around the   cervix with the Bovie.  The posterior cul-de-sac was tented down with a Kocher   and entered sharply with curved Ramirez scissors.  We had intraperitoneal access   at this point from the vagina.  A long weighted speculum was placed.  The   short was removed.  We then started to develop our bladder flap sharply,   tenting up on the vesicouterine peritoneum and using Metzenbaums.  At this   point, the uterosacral ligaments were isolated. We bilaterally clamped, cut   and suture ligated the uterosacral ligaments using curved Mai clamps and 0   Vicryl suture.  These were tagged and then we proceeded up with curved Mai   clamps bilaterally, clamping, cutting and suture ligating the cardinal   ligaments with curved Mai clamps and 0 Vicryl suture, being careful to   incorporate anterior and posterior peritoneum as we went.  At this point, the   cervix was mobile.  The uterus was mobilized.  We developed our bladder flap   more sharply and at this point, we were able to enter the vesicouterine space.    We protected this with a right angle retractor.  We then used a LigaSure   Impact to complete our dissection, starting on the right side, completing our   dissection of the cardinal ligaments, clamping down with the LigaSure Impact   and staying well away from the pelvic sidewall, coagulating and cutting.  I   did the same on the left side, all the way up through the completed dissection   of the cardinal ligaments to the uterine vessels, which were coagulated and   cut with LigaSure Impact.  There was a bit more posterior broad ligament,   which was bilaterally clamped, cut and suture ligated with LigaSure Impact and   at this point, the uterus was free.  The uterus was removed intact with the   cervix.  It was  examined, weighed, found to be 308 grams, sent for permanent   pathology.  At this point, the bowel was packed away with a moist mini lap and   we were able to look at our dissection sites bilaterally.  There was a small   area of bleeding on the patient's left hand side, which was clamped with a   right angle clamp and suture ligated.  We then ran our pedicles again   bilaterally using a sponge stick to identify any areas that were hemostatic.    We saw good hemostasis at both sites of dissection and at all pedicles.  The   moist mini lap was removed.  Short weighted speculum again was placed.  Arnold   was placed anteriorly.  We then closed our posterior peritoneum from the right   uterosacral to the left uterosacral with a running stitch of 0 Vicryl, being   careful to incorporate posterior peritoneum into the posterior vaginal cuff   and then we closed the vaginal cuff starting at the right uterosacral being   careful to incorporate both the anterior and posterior peritoneum as well as   anterior and posterior vaginal cuff running towards the left uterosacral.  At   this point, the vagina was closed.  Hemostasis was assured.  All tags were   trimmed.  Sponge stick was placed in her vagina.  We changed gloves, proceeded   back to the abdominal portion of the case after her legs were repositioned.    At this point, laparoscopy revealed hemostasis of the vaginal cuff.  We did   identify the course of the ureters from the pelvic brim down bilaterally.    Peristalsis was visualized bilaterally.  Gas was allowed to escape from the   pelvis.  The cuff was found to be intact.  All laparoscopic instruments were   removed under direct visualization.  All ports were removed under direct   visualization.  The infraumbilical port was closed with a simple stitch of 0   Vicryl at the fascial level and the skin was closed with 4-0 Vicryl in   subcuticular stitches.  At this point, the Conroy was deflated and cystoscopy   was  performed after the sponge stick was removed from the vagina.  Directed   cystoscopy through the urethra into the bladder revealed an intact bladder   dome and efflux of indigo carmine from both ureteral orifices.  At this point,   the cystoscope was removed.  The Conroy was not replaced.  Hemostasis was   assured in the vagina.  All instruments were removed from the vagina.  The   patient tolerated the procedure well.  Sponge, lap and needle counts were   correct x3.  The patient was taken to recovery in stable condition.  There   were no complications.        ______________________________  Luigi Blas MD    PBD/BIN    DD:  06/20/2023 15:07  DT:  06/20/2023 17:49    Job#:  [id number]    CC:Rachelle Vázquez MD

## 2023-06-21 NOTE — ANESTHESIA POSTPROCEDURE EVALUATION
Patient: Jennifer Cano    Procedure Summary     Date: 06/20/23 Room / Location: [facility] ROOM 21 / SURGERY SAME DAY [facility]    Anesthesia Start: 1248 Anesthesia Stop: 1507    Procedure: LAPARASCOPICALLY ASSISTED VAGINAL HYSTERECTOMY, BLADDER CYSTOSCOPY (Vagina ) Diagnosis: (ABNORMAL UTERINE BLEEDING, DYSMENORRHEA, ADNOMYOSIS)    Surgeons: Luigi Cano M.D. Responsible Provider: Rachelle Vázquez M.D.    Anesthesia Type: general ASA Status: 2          Final Anesthesia Type: general  Last vitals  BP   Blood Pressure: 123/68    Temp   36.9 °C (98.4 °F)    Pulse   84   Resp   18    SpO2   92 %      Anesthesia Post Evaluation    Patient location during evaluation: PACU  Patient participation: complete - patient participated  Level of consciousness: awake and alert  Pain score: 3    Airway patency: patent  Anesthetic complications: no  Cardiovascular status: hemodynamically stable  Respiratory status: acceptable  Hydration status: euvolemic    PONV: none          No notable events documented.     Nurse Pain Score: 3 (NPRS)

## 2023-10-16 ENCOUNTER — OFFICE VISIT (OUTPATIENT)
Dept: URGENT CARE | Facility: PHYSICIAN GROUP | Age: 43
End: 2023-10-16
Payer: COMMERCIAL

## 2023-10-16 ENCOUNTER — HOSPITAL ENCOUNTER (OUTPATIENT)
Facility: MEDICAL CENTER | Age: 43
End: 2023-10-16
Payer: COMMERCIAL

## 2023-10-16 VITALS
SYSTOLIC BLOOD PRESSURE: 128 MMHG | TEMPERATURE: 98.1 F | RESPIRATION RATE: 16 BRPM | WEIGHT: 258.05 LBS | HEIGHT: 64 IN | DIASTOLIC BLOOD PRESSURE: 82 MMHG | HEART RATE: 80 BPM | OXYGEN SATURATION: 99 % | BODY MASS INDEX: 44.06 KG/M2

## 2023-10-16 DIAGNOSIS — B96.89 BV (BACTERIAL VAGINOSIS): Primary | ICD-10-CM

## 2023-10-16 DIAGNOSIS — R10.30 LOWER ABDOMINAL PAIN: ICD-10-CM

## 2023-10-16 DIAGNOSIS — N76.0 BV (BACTERIAL VAGINOSIS): Primary | ICD-10-CM

## 2023-10-16 LAB
APPEARANCE UR: CLEAR
BILIRUB UR STRIP-MCNC: NEGATIVE MG/DL
COLOR UR AUTO: YELLOW
GLUCOSE UR STRIP.AUTO-MCNC: NEGATIVE MG/DL
KETONES UR STRIP.AUTO-MCNC: NEGATIVE MG/DL
LEUKOCYTE ESTERASE UR QL STRIP.AUTO: NEGATIVE
NITRITE UR QL STRIP.AUTO: NEGATIVE
PH UR STRIP.AUTO: 6 [PH] (ref 5–8)
PROT UR QL STRIP: NEGATIVE MG/DL
RBC UR QL AUTO: NEGATIVE
SP GR UR STRIP.AUTO: 1.01
UROBILINOGEN UR STRIP-MCNC: 0.2 MG/DL

## 2023-10-16 PROCEDURE — 3079F DIAST BP 80-89 MM HG: CPT

## 2023-10-16 PROCEDURE — 87660 TRICHOMONAS VAGIN DIR PROBE: CPT

## 2023-10-16 PROCEDURE — 87510 GARDNER VAG DNA DIR PROBE: CPT

## 2023-10-16 PROCEDURE — 3074F SYST BP LT 130 MM HG: CPT

## 2023-10-16 PROCEDURE — 99213 OFFICE O/P EST LOW 20 MIN: CPT

## 2023-10-16 PROCEDURE — 81002 URINALYSIS NONAUTO W/O SCOPE: CPT

## 2023-10-16 PROCEDURE — 87480 CANDIDA DNA DIR PROBE: CPT

## 2023-10-16 PROCEDURE — 87086 URINE CULTURE/COLONY COUNT: CPT

## 2023-10-16 ASSESSMENT — FIBROSIS 4 INDEX: FIB4 SCORE: 0.36

## 2023-10-16 NOTE — PROGRESS NOTES
Subjective:   Jennifer Cano is a 43 y.o. female who presents for Dysmenorrhea (Abd cramps. Had hysterectomy in 06/24 and has been having ongoing cramps and soreness. )          I introduced myself to the patient and informed them that I am a family nurse practitioner.    HPI:Jennifer comes in today c/o abdominal cramps, and occasional burning when she passes urine.. Onset was 06/24/2023 following hysterectomy surgery.  Patient describes symptoms as intermittent.  They describe the pain as cramping in her lower abdomen and vaginal discomfort. Aggravating factors include none. Relieving factors include none. Treatments tried at home include none. They describe their symptoms as mild to moderate.      Review of Systems   Constitutional:  Negative for chills, fever and malaise/fatigue.   Respiratory:  Negative for cough, shortness of breath and wheezing.    Cardiovascular:  Negative for chest pain and palpitations.   Gastrointestinal:  Positive for abdominal pain. Negative for blood in stool, constipation, diarrhea, melena, nausea and vomiting.   Genitourinary:  Negative for dysuria, flank pain, frequency, hematuria and urgency.   Musculoskeletal:  Negative for myalgias.   Skin:  Negative for itching and rash.   Neurological:  Negative for dizziness and headaches.   All other systems reviewed and are negative.      Medications: MULTIVITAMIN PO  ondansetron Tabs    Allergies: Neomycin-polymyxin b and Other misc    Problem List: does not have any pertinent problems on file.    Surgical History:  Past Surgical History:   Procedure Laterality Date    HYSTERECTOMY LAPAROSCOPY  6/20/2023    Procedure: LAPARASCOPICALLY ASSISTED VAGINAL HYSTERECTOMY, BLADDER CYSTOSCOPY;  Surgeon: Luigi Cano M.D.;  Location: SURGERY SAME DAY [facility];  Service: Gynecology    PRIMARY C SECTION  05/26/2023    times 2, tubal ligation with last c section    OTHER ORTHOPEDIC SURGERY Right 05/26/2023    index finger repair 2004       Past  "Social Hx:   reports that she has never smoked. She has never used smokeless tobacco. She reports current alcohol use. She reports current drug use. Drugs: Marijuana and Inhaled.     Past Family Hx:   family history includes Hypertension in her mother.     Problem list, medications, and allergies reviewed by myself today in Epic.     Objective:     /82 (BP Location: Right arm, Patient Position: Sitting, BP Cuff Size: Adult)   Pulse 80   Temp 36.7 °C (98.1 °F) (Temporal)   Resp 16   Ht 1.626 m (5' 4\")   Wt 117 kg (258 lb 0.8 oz)   SpO2 99%   BMI 44.29 kg/m²     During this visit, appropriate PPE was worn, and hand hygiene was performed.    Physical Exam  Vitals reviewed.   Constitutional:       General: She is not in acute distress.     Appearance: Normal appearance. She is not ill-appearing or toxic-appearing.   HENT:      Head: Normocephalic and atraumatic.      Right Ear: External ear normal.      Left Ear: External ear normal.      Nose: No congestion.   Eyes:      General: No scleral icterus.        Right eye: No discharge.         Left eye: No discharge.      Extraocular Movements: Extraocular movements intact.      Conjunctiva/sclera: Conjunctivae normal.   Abdominal:      General: Abdomen is protuberant. A surgical scar is present. Bowel sounds are normal. There is no distension or abdominal bruit. There are no signs of injury.      Palpations: Abdomen is soft. There is no hepatomegaly, splenomegaly, mass or pulsatile mass.      Tenderness: There is abdominal tenderness. There is no right CVA tenderness, left CVA tenderness, guarding or rebound. Negative signs include Rtacy's sign, Rovsing's sign and McBurney's sign.      Hernia: No hernia is present.          Comments: Very mild TTP R mid abdo, otherwise abdo exam neg. No peritoneal signs, no pain with dropping from tip toes to heel or stomping feet on ground.    Neurological:      Mental Status: She is alert.         Lab Results/POC Test " Results   Results for orders placed or performed in visit on 10/16/23   POCT Urinalysis   Result Value Ref Range    POC Color Yellow Negative    POC Appearance Clear Negative    POC Glucose Negative Negative mg/dL    POC Bilirubin Negative Negative mg/dL    POC Ketones Negative Negative mg/dL    POC Specific Gravity 1.010 <1.005 - >1.030    POC Blood Negative Negative    POC Urine PH 6.0 5.0 - 8.0    POC Protein Negative Negative mg/dL    POC Urobiligen 0.2 Negative (0.2) mg/dL    POC Nitrites Negative Negative    POC Leukocyte Esterase Negative Negative           Assessment/Plan:     Diagnosis and associated orders:      Comments/MDM:     1. Lower abdominal pain  UA neg. Will await vag path, pt refuses KUB in clinic today. If vag path neg, will schedule USS.  Discussed with patient her symptoms may be postoperative soreness, may possibly be caused by a vaginal pathogen.  She is agreeable to doing a vaginal pathogens self swab.  I instructed her that out of caution I will send a urine culture, I will notify her by MyChart of the results and treat with antibiotics if positive.  We discussed red flags and when to return to urgent care versus when to go to the emergency room.  She states she understands instructions and is agreeable with the plan of care.  - POCT Urinalysis  - URINE CULTURE(NEW); Future  - VAGINAL PATHOGENS DNA PANEL; Future         Pt is clinically stable at today's acute urgent care visit. Vital signs are normal and reassuring.  No acute distress noted. Appropriate for outpatient management at this time.       Discussed DDx, management options (risks,benefits, and alternatives to planned treatment), natural progression and supportive care.  Patient states they have good understanding and the treatment plan was agreed upon. Questions were encouraged and answered   Return to urgent care prn if new or worsening sx or if there is no improvement in condition prn.    Advised the patient to follow-up with  the primary care physician for recheck, reevaluation, and consideration of further management.  I instructed patient to get a pharmacy consult when picking up any prescribed medications.  Strict ER precautions discussed for any escalating pain, vaginal bleeding, fever, chills, nausea or vomiting, lethargy.  Patient states they understand all instructions.     I personally reviewed prior external notes and test results pertinent to today's visit.  I have independently reviewed and interpreted all diagnostics ordered during this urgent care acute visit.        Please note that this dictation was created using voice recognition software. I have made a reasonable attempt to correct obvious errors, but I expect that there are errors of grammar and possibly content that I did not discover before finalizing the note.    This note was electronically signed by Chuy TODD, JONATHAN, DOLORES, NATHANIEL

## 2023-10-17 ENCOUNTER — TELEPHONE (OUTPATIENT)
Dept: URGENT CARE | Facility: PHYSICIAN GROUP | Age: 43
End: 2023-10-17
Payer: COMMERCIAL

## 2023-10-17 LAB
CANDIDA DNA VAG QL PROBE+SIG AMP: NEGATIVE
G VAGINALIS DNA VAG QL PROBE+SIG AMP: POSITIVE
T VAGINALIS DNA VAG QL PROBE+SIG AMP: NEGATIVE

## 2023-10-17 RX ORDER — METRONIDAZOLE 500 MG/1
500 TABLET ORAL 2 TIMES DAILY
Qty: 14 TABLET | Refills: 0 | Status: SHIPPED | OUTPATIENT
Start: 2023-10-17 | End: 2023-10-24

## 2023-10-18 NOTE — TELEPHONE ENCOUNTER
Reviewed vaginal pathogens report, it is positive for BV.  I did call the patient and discussed the positive results and BV with her, instructed her I have sent antibiotics to her pharmacy, metronidazole twice a day for 7 days, if she does not start to feel resolution of symptoms in 3 to 4 days, consider come back to urgent care for reevaluation.  She states she understands instructions and is agreeable with the plan of care.

## 2023-10-19 LAB
BACTERIA UR CULT: NORMAL
SIGNIFICANT IND 70042: NORMAL
SITE SITE: NORMAL
SOURCE SOURCE: NORMAL

## 2023-10-22 ASSESSMENT — ENCOUNTER SYMPTOMS
ABDOMINAL PAIN: 1
FLANK PAIN: 0
VOMITING: 0
CONSTIPATION: 0
FEVER: 0
BLOOD IN STOOL: 0
DIZZINESS: 0
MYALGIAS: 0
COUGH: 0
CHILLS: 0
DIARRHEA: 0
HEADACHES: 0
PALPITATIONS: 0
SHORTNESS OF BREATH: 0
NAUSEA: 0
WHEEZING: 0

## 2023-11-22 ENCOUNTER — HOSPITAL ENCOUNTER (OUTPATIENT)
Facility: MEDICAL CENTER | Age: 43
End: 2023-11-22
Attending: PHYSICIAN ASSISTANT
Payer: COMMERCIAL

## 2023-11-22 ENCOUNTER — TELEPHONE (OUTPATIENT)
Dept: URGENT CARE | Facility: CLINIC | Age: 43
End: 2023-11-22

## 2023-11-22 ENCOUNTER — OFFICE VISIT (OUTPATIENT)
Dept: URGENT CARE | Facility: CLINIC | Age: 43
End: 2023-11-22
Payer: COMMERCIAL

## 2023-11-22 VITALS
SYSTOLIC BLOOD PRESSURE: 130 MMHG | HEART RATE: 92 BPM | BODY MASS INDEX: 44.05 KG/M2 | DIASTOLIC BLOOD PRESSURE: 84 MMHG | HEIGHT: 64 IN | RESPIRATION RATE: 16 BRPM | WEIGHT: 258 LBS | OXYGEN SATURATION: 96 % | TEMPERATURE: 97.1 F

## 2023-11-22 DIAGNOSIS — N76.1 SUBACUTE VAGINITIS: ICD-10-CM

## 2023-11-22 LAB
APPEARANCE UR: CLEAR
BILIRUB UR STRIP-MCNC: NEGATIVE MG/DL
COLOR UR AUTO: YELLOW
GLUCOSE UR STRIP.AUTO-MCNC: NEGATIVE MG/DL
KETONES UR STRIP.AUTO-MCNC: NEGATIVE MG/DL
LEUKOCYTE ESTERASE UR QL STRIP.AUTO: NORMAL
NITRITE UR QL STRIP.AUTO: NEGATIVE
PH UR STRIP.AUTO: 6 [PH] (ref 5–8)
PROT UR QL STRIP: NEGATIVE MG/DL
RBC UR QL AUTO: NEGATIVE
SP GR UR STRIP.AUTO: 1.02
UROBILINOGEN UR STRIP-MCNC: 0.2 MG/DL

## 2023-11-22 PROCEDURE — 87591 N.GONORRHOEAE DNA AMP PROB: CPT

## 2023-11-22 PROCEDURE — 99213 OFFICE O/P EST LOW 20 MIN: CPT | Performed by: PHYSICIAN ASSISTANT

## 2023-11-22 PROCEDURE — 87480 CANDIDA DNA DIR PROBE: CPT

## 2023-11-22 PROCEDURE — 3079F DIAST BP 80-89 MM HG: CPT | Performed by: PHYSICIAN ASSISTANT

## 2023-11-22 PROCEDURE — 81002 URINALYSIS NONAUTO W/O SCOPE: CPT | Performed by: PHYSICIAN ASSISTANT

## 2023-11-22 PROCEDURE — 3075F SYST BP GE 130 - 139MM HG: CPT | Performed by: PHYSICIAN ASSISTANT

## 2023-11-22 PROCEDURE — 87660 TRICHOMONAS VAGIN DIR PROBE: CPT

## 2023-11-22 PROCEDURE — 87510 GARDNER VAG DNA DIR PROBE: CPT

## 2023-11-22 PROCEDURE — 87491 CHLMYD TRACH DNA AMP PROBE: CPT

## 2023-11-22 ASSESSMENT — ENCOUNTER SYMPTOMS
NAUSEA: 0
ABDOMINAL PAIN: 0
FEVER: 0
CONSTIPATION: 0
BLOOD IN STOOL: 0
VOMITING: 0
VAGINITIS: 1
CHILLS: 0
DIARRHEA: 0
FLANK PAIN: 0

## 2023-11-22 ASSESSMENT — FIBROSIS 4 INDEX: FIB4 SCORE: 0.36

## 2023-11-22 NOTE — PROGRESS NOTES
Subjective:   Jennifer Cano  is a 43 y.o. female who presents for Vaginitis (Cramping, still has symptoms since last visit on 10/16)      Vaginitis  Primary symptoms comment: vaginal cramping sensation. This is a recurrent problem. The current episode started 1 to 4 weeks ago. Pertinent negatives include no abdominal pain, chills, constipation, diarrhea, dysuria, fever, flank pain, frequency, hematuria, nausea, rash, urgency or vomiting.   Patient presents urgent care noting mild symptoms that persisted for the last few months.  Patient had a partial hysterectomy in June.  She has had some vaginal dryness and general vaginal irritation/cramping since then.  She has spoken with her gynecologist about this was reassuring patient to wait in the symptoms with resolve in time.  She was seen to the urgent care just over a month ago at which time she was positive for bacterial vaginosis.  She was treated with Flagyl and reports taking the full course of medication as prescribed.  She notes some improvement in vaginal irritation thereafter.  Has had continued vaginal cramping.  Denies abnormal discharge or itching.  Denies dysuria frequency urgency or hematuria.  Denies flank pain or abdominal pain.  Denies nausea or vomiting.  Denies fevers or chills.  She endorses vaginal dryness since partial hysterectomy in June.  She denies concern for STIs.  She denies having had menstrual period since procedure.    Review of Systems   Constitutional:  Negative for chills and fever.   Gastrointestinal:  Negative for abdominal pain, blood in stool, constipation, diarrhea, melena, nausea and vomiting.   Genitourinary:  Negative for dysuria, flank pain, frequency, hematuria and urgency.        Denies vaginal discharge or itching   Skin:  Negative for rash.       Allergies   Allergen Reactions    Neomycin-Polymyxin B Rash     Rash, decreased healing    Other Misc Hives and Rash     Beer/champagne        Objective:   /84    "Pulse 92   Temp 36.2 °C (97.1 °F)   Resp 16   Ht 1.626 m (5' 4\")   Wt 117 kg (258 lb)   SpO2 96%   BMI 44.29 kg/m²     Physical Exam  Vitals and nursing note reviewed.   Constitutional:       General: She is not in acute distress.     Appearance: She is well-developed. She is not diaphoretic.   HENT:      Head: Normocephalic and atraumatic.      Right Ear: External ear normal.      Left Ear: External ear normal.      Nose: Nose normal.   Eyes:      General: Lids are normal. No scleral icterus.        Right eye: No discharge.         Left eye: No discharge.      Conjunctiva/sclera: Conjunctivae normal.   Pulmonary:      Effort: Pulmonary effort is normal. No respiratory distress.   Abdominal:      Tenderness: There is no right CVA tenderness or left CVA tenderness.   Musculoskeletal:         General: Normal range of motion.      Cervical back: Neck supple.   Skin:     General: Skin is warm and dry.      Coloration: Skin is not pale.      Findings: No erythema.   Neurological:      Mental Status: She is alert and oriented to person, place, and time. She is not disoriented.   Psychiatric:         Speech: Speech normal.         Behavior: Behavior normal.       POCT UA - trace leukocyte Estrace, negative for hematuria, negative for nitrites (see chart)    GC/Vag Path - pending    Assessment/Plan:   1. Subacute vaginitis  - POCT Urinalysis  - VAGINAL PATHOGENS DNA PANEL; Future  - Chlamydia/GC, PCR (Genital/Anal swab); Future    -Recommend making follow-up appointment with gynecology with chronicity of symptoms.  -We will check vaginal pathogens GC and urinalysis once again and direct treatment accordingly.  F/u w/ GYN    I have worn an N95 mask, gloves and eye protection for the entire encounter with this patient.     Differential diagnosis, natural history, supportive care, and indications for immediate follow-up discussed.       "

## 2023-11-22 NOTE — TELEPHONE ENCOUNTER
Called patient in regards to recollect of vag path testing, patient agreed to come back in to the clinic for a recollect.

## 2023-11-23 ENCOUNTER — TELEPHONE (OUTPATIENT)
Dept: URGENT CARE | Facility: CLINIC | Age: 43
End: 2023-11-23
Payer: COMMERCIAL

## 2023-11-23 DIAGNOSIS — N76.0 BV (BACTERIAL VAGINOSIS): ICD-10-CM

## 2023-11-23 DIAGNOSIS — B96.89 BV (BACTERIAL VAGINOSIS): ICD-10-CM

## 2023-11-23 LAB
C TRACH DNA GENITAL QL NAA+PROBE: NEGATIVE
CANDIDA DNA VAG QL PROBE+SIG AMP: NEGATIVE
G VAGINALIS DNA VAG QL PROBE+SIG AMP: POSITIVE
N GONORRHOEA DNA GENITAL QL NAA+PROBE: NEGATIVE
SPECIMEN SOURCE: NORMAL
T VAGINALIS DNA VAG QL PROBE+SIG AMP: NEGATIVE

## 2023-11-23 RX ORDER — METRONIDAZOLE 7.5 MG/G
1 GEL VAGINAL
Qty: 5 EACH | Refills: 0 | Status: SHIPPED | OUTPATIENT
Start: 2023-11-23 | End: 2023-11-28

## 2023-11-23 RX ORDER — METRONIDAZOLE 500 MG/1
500 TABLET ORAL 2 TIMES DAILY
Qty: 14 TABLET | Refills: 0 | Status: SHIPPED | OUTPATIENT
Start: 2023-11-23 | End: 2023-11-30

## 2023-11-23 NOTE — TELEPHONE ENCOUNTER
Patient called and spoke to the PAR.  Patient was anxious as they tested positive for bacterial vaginosis and is eager to start treatment.  Provider that they saw yesterday is out of the clinic and unavailable and out of courtesy I am happy to send prescription for metronidazole to the pharmacy.    Mian Curran P.A.-C.    1. BV (bacterial vaginosis)    - metroNIDAZOLE (FLAGYL) 500 MG Tab; Take 1 Tablet by mouth 2 times a day for 7 days.  Dispense: 14 Tablet; Refill: 0

## 2023-12-17 ENCOUNTER — OFFICE VISIT (OUTPATIENT)
Dept: URGENT CARE | Facility: CLINIC | Age: 43
End: 2023-12-17
Payer: COMMERCIAL

## 2023-12-17 ENCOUNTER — APPOINTMENT (OUTPATIENT)
Dept: RADIOLOGY | Facility: IMAGING CENTER | Age: 43
End: 2023-12-17
Payer: COMMERCIAL

## 2023-12-17 VITALS
BODY MASS INDEX: 44.05 KG/M2 | WEIGHT: 258 LBS | HEART RATE: 113 BPM | DIASTOLIC BLOOD PRESSURE: 84 MMHG | OXYGEN SATURATION: 98 % | TEMPERATURE: 99 F | HEIGHT: 64 IN | RESPIRATION RATE: 18 BRPM | SYSTOLIC BLOOD PRESSURE: 134 MMHG

## 2023-12-17 DIAGNOSIS — U07.1 COVID-19: ICD-10-CM

## 2023-12-17 DIAGNOSIS — H10.32 ACUTE BACTERIAL CONJUNCTIVITIS OF LEFT EYE: ICD-10-CM

## 2023-12-17 DIAGNOSIS — J06.9 UPPER RESPIRATORY INFECTION WITH COUGH AND CONGESTION: ICD-10-CM

## 2023-12-17 LAB
FLUAV RNA SPEC QL NAA+PROBE: NEGATIVE
FLUBV RNA SPEC QL NAA+PROBE: NEGATIVE
RSV RNA SPEC QL NAA+PROBE: NEGATIVE
SARS-COV-2 RNA RESP QL NAA+PROBE: POSITIVE

## 2023-12-17 PROCEDURE — 0241U POCT CEPHEID COV-2, FLU A/B, RSV - PCR: CPT

## 2023-12-17 PROCEDURE — 3075F SYST BP GE 130 - 139MM HG: CPT

## 2023-12-17 PROCEDURE — 99214 OFFICE O/P EST MOD 30 MIN: CPT

## 2023-12-17 PROCEDURE — 71046 X-RAY EXAM CHEST 2 VIEWS: CPT | Mod: TC | Performed by: RADIOLOGY

## 2023-12-17 PROCEDURE — 3079F DIAST BP 80-89 MM HG: CPT

## 2023-12-17 RX ORDER — OFLOXACIN 3 MG/ML
1 SOLUTION/ DROPS OPHTHALMIC 4 TIMES DAILY
Qty: 5 ML | Refills: 0 | Status: SHIPPED | OUTPATIENT
Start: 2023-12-17 | End: 2023-12-22

## 2023-12-17 ASSESSMENT — ENCOUNTER SYMPTOMS
SPUTUM PRODUCTION: 1
FEVER: 0
EYE PAIN: 0
SHORTNESS OF BREATH: 0
SORE THROAT: 0
DOUBLE VISION: 0
CHILLS: 1
MYALGIAS: 1
EYE DISCHARGE: 1
SINUS PAIN: 1
BLURRED VISION: 0
EYE REDNESS: 1
COUGH: 1

## 2023-12-17 ASSESSMENT — FIBROSIS 4 INDEX: FIB4 SCORE: 0.36

## 2023-12-17 NOTE — PROGRESS NOTES
Subjective:     CHIEF COMPLAINT  Chief Complaint   Patient presents with    Cough     X 1-2 weeks, chest congestion, green mucus, body aches        HPI  Jennifer Cano is a very pleasant 43 y.o. female who presents with a cough, chest congestion productive of green phlegm, and body aches that have been present for over 2 weeks.  She reports that her symptoms started with a head cold with yellow mucus.  She took Sudafed for symptomatic management.  She reports that she felt her cough was starting to improve on Friday, so she went to a [employer] club and was sitting next to a person who was persistently coughing and blowing their nose.  Soon after, she started experiencing terrible body aches and reports that her cough returned and her symptoms have progressively worsened.  Additionally, she awoke with her left eye crusted shut with mucoid discharge and has noticed that her left eye is appearing red.  She does not use contact lenses and denies any new onset changes to the vision of her left eye.  She has been taking Delsym for her cough with good relief of symptoms.    REVIEW OF SYSTEMS  Review of Systems   Constitutional:  Positive for chills and malaise/fatigue. Negative for fever.   HENT:  Positive for congestion and sinus pain. Negative for sore throat.    Eyes:  Positive for discharge (L eye) and redness (L eye). Negative for blurred vision, double vision and pain.   Respiratory:  Positive for cough and sputum production. Negative for shortness of breath.    Cardiovascular:  Negative for chest pain.   Musculoskeletal:  Positive for myalgias.       PAST MEDICAL HISTORY  Patient Active Problem List    Diagnosis Date Noted    Class 1 obesity in adult 02/01/2019    Dyslipidemia 01/22/2019    Obesity (BMI 35.0-39.9 without comorbidity) ([facility]) 01/06/2018    Osteopenia 02/08/2016    Migraines, neuralgic 06/01/2009    Asthma, well controlled 06/01/2009       SURGICAL HISTORY   has a past surgical history that  "includes primary c section (05/26/2023); other orthopedic surgery (Right, 05/26/2023); and hysterectomy laparoscopy (6/20/2023).    ALLERGIES  Allergies   Allergen Reactions    Neomycin-Polymyxin B Rash     Rash, decreased healing    Other Misc Hives and Rash     Beer/champagne       CURRENT MEDICATIONS  Home Medications       Reviewed by Cecilia Rangel P.A.-C. (Physician Assistant) on 12/17/23 at 1501  Med List Status: <None>     Medication Last Dose Status   Multiple Vitamin (MULTIVITAMIN PO) Taking Active                    SOCIAL HISTORY  Social History     Tobacco Use    Smoking status: Never    Smokeless tobacco: Never   Vaping Use    Vaping Use: Never used   Substance and Sexual Activity    Alcohol use: Yes     Comment: 2 times weekly    Drug use: Yes     Types: Marijuana, Inhaled     Comment: at [facility] PRN    Sexual activity: Not Currently       FAMILY HISTORY  Family History   Problem Relation Age of Onset    Hypertension Mother           Objective:     VITAL SIGNS: /84   Pulse (!) 113   Temp 37.2 °C (99 °F)   Resp 18   Ht 1.626 m (5' 4\")   Wt 117 kg (258 lb)   SpO2 98%   BMI 44.29 kg/m²     PHYSICAL EXAM  Physical Exam  Vitals reviewed.   Constitutional:       General: She is not in acute distress.     Appearance: Normal appearance. She is ill-appearing. She is not toxic-appearing or diaphoretic.   HENT:      Head: Normocephalic and atraumatic.      Nose: Congestion present.      Mouth/Throat:      Mouth: Mucous membranes are moist.      Pharynx: Oropharynx is clear. Posterior oropharyngeal erythema present. No oropharyngeal exudate.      Comments: Uvula midline  Eyes:      General:         Right eye: No discharge.         Left eye: Discharge present.     Extraocular Movements: Extraocular movements intact.      Pupils: Pupils are equal, round, and reactive to light.      Comments: Left conjunctiva is injected with scant mucoid discharge present   Cardiovascular:      Rate and Rhythm: Normal " rate and regular rhythm.      Heart sounds: Normal heart sounds.   Pulmonary:      Effort: Pulmonary effort is normal. No respiratory distress.      Breath sounds: Normal breath sounds. No stridor. No wheezing, rhonchi or rales.   Lymphadenopathy:      Cervical: No cervical adenopathy.   Skin:     General: Skin is warm and dry.      Coloration: Skin is not pale.   Neurological:      General: No focal deficit present.      Mental Status: She is alert.   Psychiatric:         Mood and Affect: Mood normal.         Assessment/Plan:     1. COVID-19  - POCT CEPHEID COV-2, FLU A/B, RSV - PCR  - DX-CHEST-2 VIEWS; Future    2. Acute bacterial conjunctivitis of left eye  - ofloxacin (OCUFLOX) 0.3 % Solution; Administer 1 Drop into the left eye 4 times a day for 5 days.  Dispense: 5 mL; Refill: 0  -Tylenol/ibuprofen OTC as needed for discomfort  -Rest and hydrate  -Return to clinic if symptoms worsen or fail to resolve    MDM/Comments:  Patient is displaying systemic symptoms including tachycardia on physical examination. These symptoms are likely contributed to COVID-19.  Patient is afebrile and has a pulse oxygen of 98% on room air.  I have prepared for this visit by personally reviewing the patient's prevous medical records, vitals, and labs including: most recent GFR of 103 mL/min and Creatinine of 0.74.  Chest x-ray performed in office showing no acute cardiopulmonary abnormalities or evidence of pneumonia at this time.  Patient offered benzonatate for cough relief, but reports she has been having good relief with Delsym.  Viral Cepheid testing performed in office with positive COVID results.  Patient called and informed of results.  Shared decision making used and patient will not be treated with Paxlovid or Lagevrio at this time given that we are not certain whether the onset of her symptoms was 2 weeks ago or within the last few days given that she has had a cough and has been feeling sick for approximately 2 weeks.   Isolation protocols discussed.  Patient provided a work note with permission to list that she has COVID-19.  Strict return to clinic/ER precautions discussed if symptoms worsen in any way or fail to resolve.  Patient demonstrated understanding of the treatment plan at this time.      Differential diagnosis, natural history, supportive care, and indications for immediate follow-up discussed. All questions answered. Patient agrees with the plan of care.    Follow-up as needed if symptoms worsen or fail to improve to PCP, Urgent care or Emergency Room.    I have personally reviewed prior external notes and test results pertinent to today's visit.  I have independently reviewed and interpreted all diagnostics ordered during this urgent care acute visit.   Discussed management options (risks,benefits, and alternatives to treatment). Pt expresses understanding and the treatment plan was agreed upon. Questions were encouraged and answered to pt's satisfaction.    Please note that this dictation was created using voice recognition software. I have made a reasonable attempt to correct obvious errors, but I expect that there are errors of grammar and possibly content that I did not discover before finalizing the note.

## 2023-12-17 NOTE — LETTER
December 17, 2023    To Whom It May Concern:         This is confirmation that Jennifer Cano attended her scheduled appointment with Cecilia Rangel P.A.-C. on 12/17/23. She has tested positive for COVID-19. She is recommended to isolate at home for the first five days from the onset of symptoms. She may return to work and normal activities on days 6-10 but must wear a mask.          If you have any questions please do not hesitate to call me at the phone number listed below.    Sincerely,          Cecilia Rangel P.A.-C.  [phone]

## 2024-02-02 ENCOUNTER — APPOINTMENT (RX ONLY)
Dept: URBAN - METROPOLITAN AREA CLINIC 20 | Facility: CLINIC | Age: 44
Setting detail: DERMATOLOGY
End: 2024-02-02

## 2024-02-02 DIAGNOSIS — L73.8 OTHER SPECIFIED FOLLICULAR DISORDERS: ICD-10-CM

## 2024-02-02 DIAGNOSIS — L72.0 EPIDERMAL CYST: ICD-10-CM

## 2024-02-02 PROCEDURE — ? COSMETIC EXTRACTIONS

## 2024-02-02 PROCEDURE — ? ELECTRODESICCATION (COSMETIC)

## 2024-02-02 ASSESSMENT — LOCATION DETAILED DESCRIPTION DERM
LOCATION DETAILED: LEFT INFERIOR LATERAL MALAR CHEEK
LOCATION DETAILED: RIGHT INFERIOR NASAL CHEEK
LOCATION DETAILED: RIGHT INFERIOR CENTRAL MALAR CHEEK
LOCATION DETAILED: LEFT MEDIAL MALAR CHEEK
LOCATION DETAILED: RIGHT INFERIOR LATERAL FOREHEAD
LOCATION DETAILED: LEFT INFERIOR LATERAL FOREHEAD
LOCATION DETAILED: RIGHT CENTRAL MALAR CHEEK
LOCATION DETAILED: LEFT SUPERIOR CENTRAL MALAR CHEEK
LOCATION DETAILED: LEFT CENTRAL BUCCAL CHEEK
LOCATION DETAILED: LEFT INFERIOR CENTRAL MALAR CHEEK
LOCATION DETAILED: RIGHT FOREHEAD
LOCATION DETAILED: SUPERIOR MID FOREHEAD

## 2024-02-02 ASSESSMENT — LOCATION ZONE DERM: LOCATION ZONE: FACE

## 2024-02-02 ASSESSMENT — LOCATION SIMPLE DESCRIPTION DERM
LOCATION SIMPLE: LEFT CHEEK
LOCATION SIMPLE: RIGHT CHEEK
LOCATION SIMPLE: SUPERIOR FOREHEAD
LOCATION SIMPLE: RIGHT FOREHEAD
LOCATION SIMPLE: LEFT FOREHEAD

## 2024-02-02 NOTE — PROCEDURE: ELECTRODESICCATION (COSMETIC)
Anesthesia Volume In Cc: 0
Post-Care Instructions: I reviewed with the patient in detail post-care instructions. Patient is to wear sunprotection, and avoid picking at any of the treated lesions. Pt may apply Vaseline to crusted or scabbing areas
Francis: 0.6
Detail Level: Zone
Price (Use Numbers Only, No Special Characters Or $): 160
Consent: The patient's consent was obtained including but not limited to risks of crusting, scabbing, blistering, scarring, darker or lighter pigmentary change, recurrence, incomplete removal and infection.

## 2024-05-14 ENCOUNTER — OFFICE VISIT (OUTPATIENT)
Dept: MEDICAL GROUP | Facility: LAB | Age: 44
End: 2024-05-14
Payer: COMMERCIAL

## 2024-05-14 VITALS
RESPIRATION RATE: 16 BRPM | HEART RATE: 87 BPM | TEMPERATURE: 98.1 F | BODY MASS INDEX: 44.9 KG/M2 | SYSTOLIC BLOOD PRESSURE: 120 MMHG | DIASTOLIC BLOOD PRESSURE: 76 MMHG | OXYGEN SATURATION: 93 % | WEIGHT: 263 LBS | HEIGHT: 64 IN

## 2024-05-14 DIAGNOSIS — N76.0 VAGINOSIS: ICD-10-CM

## 2024-05-14 DIAGNOSIS — M25.60 STIFFNESS IN JOINT: ICD-10-CM

## 2024-05-14 DIAGNOSIS — J45.20 ASTHMA, MILD INTERMITTENT, WELL-CONTROLLED: Chronic | ICD-10-CM

## 2024-05-14 PROCEDURE — 3074F SYST BP LT 130 MM HG: CPT | Performed by: INTERNAL MEDICINE

## 2024-05-14 PROCEDURE — 3078F DIAST BP <80 MM HG: CPT | Performed by: INTERNAL MEDICINE

## 2024-05-14 PROCEDURE — 99214 OFFICE O/P EST MOD 30 MIN: CPT | Performed by: INTERNAL MEDICINE

## 2024-05-14 ASSESSMENT — FIBROSIS 4 INDEX: FIB4 SCORE: 0.36

## 2024-05-15 ENCOUNTER — APPOINTMENT (RX ONLY)
Dept: URBAN - METROPOLITAN AREA CLINIC 22 | Facility: CLINIC | Age: 44
Setting detail: DERMATOLOGY
End: 2024-05-15

## 2024-05-15 DIAGNOSIS — L91.8 OTHER HYPERTROPHIC DISORDERS OF THE SKIN: ICD-10-CM

## 2024-05-15 DIAGNOSIS — B07.8 OTHER VIRAL WARTS: ICD-10-CM

## 2024-05-15 PROCEDURE — ? RECOMMENDATIONS

## 2024-05-15 PROCEDURE — ? LIQUID NITROGEN

## 2024-05-15 PROCEDURE — 17110 DESTRUCTION B9 LES UP TO 14: CPT

## 2024-05-15 PROCEDURE — ? COUNSELING

## 2024-05-15 ASSESSMENT — LOCATION SIMPLE DESCRIPTION DERM
LOCATION SIMPLE: RIGHT PLANTAR SURFACE
LOCATION SIMPLE: LEFT POSTERIOR AXILLA
LOCATION SIMPLE: RIGHT ANTERIOR AXILLA
LOCATION SIMPLE: LEFT AXILLARY VAULT

## 2024-05-15 ASSESSMENT — LOCATION DETAILED DESCRIPTION DERM
LOCATION DETAILED: LEFT AXILLARY VAULT
LOCATION DETAILED: RIGHT INSTEP
LOCATION DETAILED: RIGHT ANTERIOR AXILLA
LOCATION DETAILED: LEFT POSTERIOR AXILLA

## 2024-05-15 ASSESSMENT — LOCATION ZONE DERM
LOCATION ZONE: FEET
LOCATION ZONE: AXILLAE

## 2024-05-15 NOTE — PROGRESS NOTES
CC: Jennifer Cano is a 44 y.o. female is suffering from   Chief Complaint   Patient presents with    Body Aches         SUBJECTIVE: 1. Asthma, mild intermittent, well-controlled  Patient is here for follow-up has a history of asthma in the past which has been well-controlled, has now developed having problems with joint stiffness and vaginosis  - TAMARA REFLEXIVE PROFILE; Future  - RHEUMATOID ARTHRITIS FACTOR; Future  - Comp Metabolic Panel; Future  - CBC WITH DIFFERENTIAL; Future  - Sed Rate; Future  - CRP QUANTITIVE (NON-CARDIAC); Future  - TSH+FREE T4  - URINALYSIS,CULTURE IF INDICATED; Future    2. Stiffness in joint  Etiology behind joint stiffness is uncertain multiple labs ordered to assess    3. Vaginosis  Patient has been followed by obstetrics gynecology    History of Present Illness      Past social, family, history: ,   Social History     Tobacco Use    Smoking status: Never    Smokeless tobacco: Never   Vaping Use    Vaping Use: Never used   Substance Use Topics    Alcohol use: Yes     Comment: 2 times weekly    Drug use: Yes     Types: Marijuana, Inhaled     Comment: at HS PRN         MEDICATIONS:    Current Outpatient Medications:     Multiple Vitamin (MULTIVITAMIN PO), Take  by mouth as needed., Disp: , Rfl:     PROBLEMS:  Patient Active Problem List    Diagnosis Date Noted    Class 1 obesity in adult 02/01/2019    Dyslipidemia 01/22/2019    Obesity (BMI 35.0-39.9 without comorbidity) ([facility]) 01/06/2018    Osteopenia 02/08/2016    Migraines, neuralgic 06/01/2009    Asthma, well controlled 06/01/2009       REVIEW OF SYSTEMS:  Gen.:  No Nausea, Vomiting, fever, Chills.  Heart: No chest pain.  Lungs:  No shortness of Breath.  Psychological: Sudhakar unusual Anxiety depression     PHYSICAL EXAM   Physical Exam       Constitutional: Alert, cooperative, not in acute distress.  Cardiovascular:  Rate Rhythm is regular without murmurs rubs clicks.     Thorax & Lungs: Clear to  "auscultation, no wheezing, rhonchi, or rales  HENT: Normocephalic, Atraumatic.  Eyes: PERRLA, EOMI, Conjunctiva normal.   Neck: Trachia is midline no swelling of the thyroid.   Lymphatic: No lymphadenopathy noted.   Neurologic: Alert & oriented x 3, cranial nerves II through XII are intact, Normal motor function, Normal sensory function, No focal deficits noted.   Psychiatric: Affect normal, Judgment normal, Mood normal.     VITAL SIGNS:/76 (BP Location: Left arm, Patient Position: Sitting, BP Cuff Size: Large adult)   Pulse 87   Temp 36.7 °C (98.1 °F)   Resp 16   Ht 1.626 m (5' 4\")   Wt 119 kg (263 lb)   SpO2 93%   BMI 45.14 kg/m²     Labs: Reviewed    Assessment:                                                     Plan:  Assessment & Plan     1. Asthma, mild intermittent, well-controlled  Labs ordered  - TAMARA REFLEXIVE PROFILE; Future  - RHEUMATOID ARTHRITIS FACTOR; Future  - Comp Metabolic Panel; Future  - CBC WITH DIFFERENTIAL; Future  - Sed Rate; Future  - CRP QUANTITIVE (NON-CARDIAC); Future  - TSH+FREE T4  - URINALYSIS,CULTURE IF INDICATED; Future    2. Stiffness in joint  Joint stiffness associated with vaginosis etiology uncertain    3. Vaginosis  Etiology uncertain      "

## 2024-05-15 NOTE — PROCEDURE: LIQUID NITROGEN
Post-Care Instructions: I reviewed with the patient in detail post-care instructions. Patient is to wear sunprotection, and avoid picking at any of the treated lesions. Pt may apply Vaseline to crusted or scabbing areas.
Show Spray Paint Technique Variable?: Yes
Medical Necessity Clause: This procedure was medically necessary because the lesions that were treated were:
Spray Paint Technique: No
Medical Necessity Information: It is in your best interest to select a reason for this procedure from the list below. All of these items fulfill various CMS LCD requirements except the new and changing color options.
Consent: The patient's mom’s consent was obtained including but not limited to risks of crusting, scabbing, blistering, scarring, darker or lighter pigmentary change, recurrence, incomplete removal and infection.
Spray Paint Text: The liquid nitrogen was applied to the skin utilizing a spray paint frosting technique.
Detail Level: Detailed
Duration Of Freeze Thaw-Cycle (Seconds): 0
Consent: The patient's consent was obtained including but not limited to risks of crusting, scabbing, blistering, scarring, darker or lighter pigmentary change, recurrence, incomplete removal and infection.
How Severe Is Your Skin Lesion?: moderate
Have Your Skin Lesions Been Treated?: not been treated
Is This A New Presentation, Or A Follow-Up?: Growths

## 2024-05-15 NOTE — PROCEDURE: RECOMMENDATIONS
Recommendations (Free Text): 40% salicylic acid
Detail Level: Zone
Render Risk Assessment In Note?: no

## 2024-05-16 ENCOUNTER — HOSPITAL ENCOUNTER (OUTPATIENT)
Dept: LAB | Facility: MEDICAL CENTER | Age: 44
End: 2024-05-16
Attending: INTERNAL MEDICINE
Payer: COMMERCIAL

## 2024-05-16 ENCOUNTER — TELEPHONE (OUTPATIENT)
Dept: MEDICAL GROUP | Facility: LAB | Age: 44
End: 2024-05-16
Payer: COMMERCIAL

## 2024-05-16 DIAGNOSIS — N30.00 ACUTE CYSTITIS WITHOUT HEMATURIA: ICD-10-CM

## 2024-05-16 DIAGNOSIS — J45.20 ASTHMA, MILD INTERMITTENT, WELL-CONTROLLED: Chronic | ICD-10-CM

## 2024-05-16 LAB
ALBUMIN SERPL BCP-MCNC: 4.2 G/DL (ref 3.2–4.9)
ALBUMIN/GLOB SERPL: 1.4 G/DL
ALP SERPL-CCNC: 91 U/L (ref 30–99)
ALT SERPL-CCNC: 26 U/L (ref 2–50)
ANION GAP SERPL CALC-SCNC: 12 MMOL/L (ref 7–16)
APPEARANCE UR: ABNORMAL
AST SERPL-CCNC: 22 U/L (ref 12–45)
BACTERIA #/AREA URNS HPF: ABNORMAL /HPF
BASOPHILS # BLD AUTO: 0.5 % (ref 0–1.8)
BASOPHILS # BLD: 0.04 K/UL (ref 0–0.12)
BILIRUB SERPL-MCNC: 0.5 MG/DL (ref 0.1–1.5)
BILIRUB UR QL STRIP.AUTO: NEGATIVE
BUN SERPL-MCNC: 10 MG/DL (ref 8–22)
CALCIUM ALBUM COR SERPL-MCNC: 9.1 MG/DL (ref 8.5–10.5)
CALCIUM SERPL-MCNC: 9.3 MG/DL (ref 8.5–10.5)
CHLORIDE SERPL-SCNC: 102 MMOL/L (ref 96–112)
CO2 SERPL-SCNC: 23 MMOL/L (ref 20–33)
COLOR UR: YELLOW
CREAT SERPL-MCNC: 0.65 MG/DL (ref 0.5–1.4)
CRP SERPL HS-MCNC: <0.3 MG/DL (ref 0–0.75)
EOSINOPHIL # BLD AUTO: 0.16 K/UL (ref 0–0.51)
EOSINOPHIL NFR BLD: 1.9 % (ref 0–6.9)
EPI CELLS #/AREA URNS HPF: ABNORMAL /HPF
ERYTHROCYTE [DISTWIDTH] IN BLOOD BY AUTOMATED COUNT: 40.5 FL (ref 35.9–50)
ERYTHROCYTE [SEDIMENTATION RATE] IN BLOOD BY WESTERGREN METHOD: 11 MM/HOUR (ref 0–25)
GFR SERPLBLD CREATININE-BSD FMLA CKD-EPI: 111 ML/MIN/1.73 M 2
GLOBULIN SER CALC-MCNC: 2.9 G/DL (ref 1.9–3.5)
GLUCOSE SERPL-MCNC: 100 MG/DL (ref 65–99)
GLUCOSE UR STRIP.AUTO-MCNC: NEGATIVE MG/DL
HCT VFR BLD AUTO: 43.3 % (ref 37–47)
HGB BLD-MCNC: 14.3 G/DL (ref 12–16)
HYALINE CASTS #/AREA URNS LPF: ABNORMAL /LPF
IMM GRANULOCYTES # BLD AUTO: 0.01 K/UL (ref 0–0.11)
IMM GRANULOCYTES NFR BLD AUTO: 0.1 % (ref 0–0.9)
KETONES UR STRIP.AUTO-MCNC: NEGATIVE MG/DL
LEUKOCYTE ESTERASE UR QL STRIP.AUTO: ABNORMAL
LYMPHOCYTES # BLD AUTO: 3.16 K/UL (ref 1–4.8)
LYMPHOCYTES NFR BLD: 37.4 % (ref 22–41)
MCH RBC QN AUTO: 29.1 PG (ref 27–33)
MCHC RBC AUTO-ENTMCNC: 33 G/DL (ref 32.2–35.5)
MCV RBC AUTO: 88.2 FL (ref 81.4–97.8)
MICRO URNS: ABNORMAL
MONOCYTES # BLD AUTO: 0.47 K/UL (ref 0–0.85)
MONOCYTES NFR BLD AUTO: 5.6 % (ref 0–13.4)
NEUTROPHILS # BLD AUTO: 4.62 K/UL (ref 1.82–7.42)
NEUTROPHILS NFR BLD: 54.5 % (ref 44–72)
NITRITE UR QL STRIP.AUTO: NEGATIVE
NRBC # BLD AUTO: 0 K/UL
NRBC BLD-RTO: 0 /100 WBC (ref 0–0.2)
PH UR STRIP.AUTO: 6.5 [PH] (ref 5–8)
PLATELET # BLD AUTO: 254 K/UL (ref 164–446)
PMV BLD AUTO: 10 FL (ref 9–12.9)
POTASSIUM SERPL-SCNC: 3.8 MMOL/L (ref 3.6–5.5)
PROT SERPL-MCNC: 7.1 G/DL (ref 6–8.2)
PROT UR QL STRIP: NEGATIVE MG/DL
RBC # BLD AUTO: 4.91 M/UL (ref 4.2–5.4)
RBC # URNS HPF: ABNORMAL /HPF
RBC UR QL AUTO: ABNORMAL
RHEUMATOID FACT SER IA-ACNC: 11 IU/ML (ref 0–14)
SODIUM SERPL-SCNC: 137 MMOL/L (ref 135–145)
SP GR UR STRIP.AUTO: 1
T4 FREE SERPL-MCNC: 1 NG/DL (ref 0.93–1.7)
TSH SERPL DL<=0.005 MIU/L-ACNC: 2.06 UIU/ML (ref 0.38–5.33)
UROBILINOGEN UR STRIP.AUTO-MCNC: 0.2 MG/DL
WBC # BLD AUTO: 8.5 K/UL (ref 4.8–10.8)
WBC #/AREA URNS HPF: ABNORMAL /HPF

## 2024-05-16 RX ORDER — NITROFURANTOIN 25; 75 MG/1; MG/1
100 CAPSULE ORAL 2 TIMES DAILY
Qty: 10 CAPSULE | Refills: 0 | Status: SHIPPED | OUTPATIENT
Start: 2024-05-16

## 2024-05-16 RX ORDER — NITROFURANTOIN 25; 75 MG/1; MG/1
100 CAPSULE ORAL 2 TIMES DAILY
Qty: 10 CAPSULE | Refills: 0 | Status: SHIPPED
Start: 2024-05-16

## 2024-05-16 NOTE — TELEPHONE ENCOUNTER
Telephone call to patient, answering machine only states that we need to send a text message, [employer] message sent to her regarding probable UTI

## 2024-05-19 LAB
ANA PAT SER IF-IMP: ABNORMAL
ANA PAT SER IF-IMP: ABNORMAL
NUCLEAR IGG SER QL IA: DETECTED
NUCLEAR IGG SER QL IF: DETECTED
NUCLEAR IGG TITR SER IF: ABNORMAL {TITER}

## 2024-05-20 LAB — DSDNA AB TITR SER CLIF: NORMAL {TITER}

## 2024-05-21 LAB
ENA JO1 AB TITR SER: 1 AU/ML (ref 0–40)
ENA SCL70 IGG SER QL: 1 AU/ML (ref 0–40)
ENA SM IGG SER-ACNC: 3 AU/ML (ref 0–40)
ENA SS-A 60KD AB SER-ACNC: 0 AU/ML (ref 0–40)
ENA SS-A IGG SER QL: 1 AU/ML (ref 0–40)
ENA SS-B IGG SER IA-ACNC: 0 AU/ML (ref 0–40)
U1 SNRNP IGG SER QL: 4 UNITS (ref 0–19)

## 2024-06-13 ENCOUNTER — APPOINTMENT (RX ONLY)
Dept: URBAN - METROPOLITAN AREA CLINIC 22 | Facility: CLINIC | Age: 44
Setting detail: DERMATOLOGY
End: 2024-06-13

## 2024-06-13 DIAGNOSIS — B07.8 OTHER VIRAL WARTS: ICD-10-CM

## 2024-06-13 PROCEDURE — 17110 DESTRUCTION B9 LES UP TO 14: CPT

## 2024-06-13 PROCEDURE — ? COUNSELING

## 2024-06-13 PROCEDURE — ? LIQUID NITROGEN

## 2024-06-13 PROCEDURE — ? ADDITIONAL NOTES

## 2024-06-13 PROCEDURE — ? RECOMMENDATIONS

## 2024-06-13 ASSESSMENT — LOCATION DETAILED DESCRIPTION DERM: LOCATION DETAILED: RIGHT INSTEP

## 2024-06-13 ASSESSMENT — LOCATION SIMPLE DESCRIPTION DERM: LOCATION SIMPLE: RIGHT PLANTAR SURFACE

## 2024-06-13 ASSESSMENT — LOCATION ZONE DERM: LOCATION ZONE: FEET

## 2024-06-13 NOTE — PROCEDURE: ADDITIONAL NOTES
Render Risk Assessment In Note?: no
Detail Level: Simple
Additional Notes: Improved but then returned.  \\nSite was pared prior to LN2. Pt gave verbal consent for LN2. Discussed using Sal acid at home.

## 2024-06-13 NOTE — PROCEDURE: LIQUID NITROGEN
Post-Care Instructions: I reviewed with the patient in detail post-care instructions. Patient is to wear sunprotection, and avoid picking at any of the treated lesions. Pt may apply Vaseline to crusted or scabbing areas.
Show Spray Paint Technique Variable?: Yes
Medical Necessity Clause: This procedure was medically necessary because the lesions that were treated were:
Spray Paint Technique: No
Medical Necessity Information: It is in your best interest to select a reason for this procedure from the list below. All of these items fulfill various CMS LCD requirements except the new and changing color options.
Consent: The patient's mom’s consent was obtained including but not limited to risks of crusting, scabbing, blistering, scarring, darker or lighter pigmentary change, recurrence, incomplete removal and infection.
Spray Paint Text: The liquid nitrogen was applied to the skin utilizing a spray paint frosting technique.
Detail Level: Detailed

## 2024-06-19 ENCOUNTER — OFFICE VISIT (OUTPATIENT)
Dept: MEDICAL GROUP | Facility: LAB | Age: 44
End: 2024-06-19
Payer: COMMERCIAL

## 2024-06-19 VITALS
TEMPERATURE: 97.6 F | HEIGHT: 64 IN | SYSTOLIC BLOOD PRESSURE: 140 MMHG | RESPIRATION RATE: 14 BRPM | DIASTOLIC BLOOD PRESSURE: 90 MMHG | WEIGHT: 258 LBS | OXYGEN SATURATION: 96 % | BODY MASS INDEX: 44.05 KG/M2 | HEART RATE: 88 BPM

## 2024-06-19 DIAGNOSIS — J02.9 SORE THROAT: ICD-10-CM

## 2024-06-19 DIAGNOSIS — R07.89 CHEST DISCOMFORT: ICD-10-CM

## 2024-06-19 DIAGNOSIS — N30.00 ACUTE CYSTITIS WITHOUT HEMATURIA: ICD-10-CM

## 2024-06-19 LAB
INT CON NEG: NEGATIVE
INT CON POS: POSITIVE
S PYO AG THROAT QL: NEGATIVE

## 2024-06-19 PROCEDURE — 99214 OFFICE O/P EST MOD 30 MIN: CPT | Performed by: INTERNAL MEDICINE

## 2024-06-19 PROCEDURE — 87880 STREP A ASSAY W/OPTIC: CPT | Performed by: INTERNAL MEDICINE

## 2024-06-19 PROCEDURE — 3080F DIAST BP >= 90 MM HG: CPT | Performed by: INTERNAL MEDICINE

## 2024-06-19 PROCEDURE — 3077F SYST BP >= 140 MM HG: CPT | Performed by: INTERNAL MEDICINE

## 2024-06-19 RX ORDER — AMOXICILLIN AND CLAVULANATE POTASSIUM 875; 125 MG/1; MG/1
1 TABLET, FILM COATED ORAL 2 TIMES DAILY
Qty: 14 TABLET | Refills: 0 | Status: SHIPPED | OUTPATIENT
Start: 2024-06-19

## 2024-06-19 ASSESSMENT — FIBROSIS 4 INDEX: FIB4 SCORE: 0.75

## 2024-06-19 NOTE — PROGRESS NOTES
CC: Jennifer Cano is a 44 y.o. female is suffering from   Chief Complaint   Patient presents with    Lab Results         SUBJECTIVE:  1. Chest discomfort  Ami is here for follow-up is having a complaining of having problems with chest discomfort this week and also upper respiratory tract infection    2. Sore throat  Patient with a sore throat etiology behind this is uncertain    3. Acute cystitis without hematuria  History of acute cystitis  History of Present Illness      Past social, family, history: , 11-year-old son  Social History     Tobacco Use    Smoking status: Never    Smokeless tobacco: Never   Vaping Use    Vaping status: Never Used   Substance Use Topics    Alcohol use: Yes     Comment: 2 times weekly    Drug use: Yes     Types: Marijuana, Inhaled     Comment: at [facility]         MEDICATIONS:    Current Outpatient Medications:     amoxicillin-clavulanate (AUGMENTIN) 875-125 MG Tab, Take 1 Tablet by mouth 2 times a day., Disp: 14 Tablet, Rfl: 0    nitrofurantoin (MACROBID) 100 MG Cap, Take 1 Capsule by mouth 2 times a day., Disp: 10 Capsule, Rfl: 0    nitrofurantoin (MACROBID) 100 MG Cap, Take 1 Capsule by mouth 2 times a day., Disp: 10 Capsule, Rfl: 0    Multiple Vitamin (MULTIVITAMIN PO), Take  by mouth as needed., Disp: , Rfl:     PROBLEMS:  Patient Active Problem List    Diagnosis Date Noted    Class 1 obesity in adult 02/01/2019    Dyslipidemia 01/22/2019    Obesity (BMI 35.0-39.9 without comorbidity) ([facility]) 01/06/2018    Osteopenia 02/08/2016    Migraines, neuralgic 06/01/2009    Asthma, well controlled 06/01/2009       REVIEW OF SYSTEMS:  Gen.:  No Nausea, Vomiting, fever, Chills.  Heart: No chest pain.  Lungs:  No shortness of Breath.  Psychological: Sudhakar unusual Anxiety depression     PHYSICAL EXAM   Physical Exam       Constitutional: Alert, cooperative, not in acute distress.  Cardiovascular:  Rate Rhythm is regular without murmurs rubs clicks.     Thorax & Lungs: Clear to  "auscultation, no wheezing, rhonchi, or rales  HENT: Normocephalic, Atraumatic.  Eyes: PERRLA, EOMI, Conjunctiva normal.   Neck: Trachia is midline no swelling of the thyroid.   Lymphatic: No lymphadenopathy noted.   Neurologic: Alert & oriented x 3, cranial nerves II through XII are intact, Normal motor function, Normal sensory function, No focal deficits noted.   Psychiatric: Affect normal, Judgment normal, Mood normal.     VITAL SIGNS:BP (!) 140/90 (BP Location: Left arm, Patient Position: Sitting, BP Cuff Size: Large adult)   Pulse 88   Temp 36.4 °C (97.6 °F)   Resp 14   Ht 1.626 m (5' 4\")   Wt 117 kg (258 lb)   SpO2 96%   BMI 44.29 kg/m²     Labs: Reviewed    Assessment:                                                     Plan:  Assessment & Plan       1. Chest discomfort  EKG sinus rhythm normal axis no evidence of ST segment elevation or depression  - EKG    2. Sore throat  Sore throat negative rapid strep, start the patient on Augmentin  - POCT Rapid Strep A  - amoxicillin-clavulanate (AUGMENTIN) 875-125 MG Tab; Take 1 Tablet by mouth 2 times a day.  Dispense: 14 Tablet; Refill: 0    3. Acute cystitis without hematuria  Acute cystitis without hematuria complete urinalysis ordered  - URINALYSIS,CULTURE IF INDICATED; Future        "

## 2024-07-09 ENCOUNTER — APPOINTMENT (RX ONLY)
Dept: URBAN - METROPOLITAN AREA CLINIC 22 | Facility: CLINIC | Age: 44
Setting detail: DERMATOLOGY
End: 2024-07-09

## 2024-07-09 DIAGNOSIS — B07.8 OTHER VIRAL WARTS: ICD-10-CM

## 2024-07-09 PROCEDURE — ? BENIGN DESTRUCTION

## 2024-07-09 PROCEDURE — ? COUNSELING

## 2024-07-09 PROCEDURE — 17110 DESTRUCTION B9 LES UP TO 14: CPT

## 2024-07-09 PROCEDURE — ? ADDITIONAL NOTES

## 2024-07-09 ASSESSMENT — LOCATION DETAILED DESCRIPTION DERM: LOCATION DETAILED: RIGHT PLANTAR FOREFOOT OVERLYING 2ND METATARSAL

## 2024-07-09 ASSESSMENT — LOCATION SIMPLE DESCRIPTION DERM: LOCATION SIMPLE: RIGHT PLANTAR SURFACE

## 2024-07-09 ASSESSMENT — LOCATION ZONE DERM: LOCATION ZONE: FEET

## 2024-07-09 NOTE — PROCEDURE: BENIGN DESTRUCTION
Post-Care Instructions: I reviewed with the patient in detail post-care instructions. Patient is to wear sunprotection, and avoid picking at any of the treated lesions. Pt may apply Vaseline to crusted or scabbing areas.
Render Post-Care Instructions In Note?: no
Medical Necessity Information: It is in your best interest to select a reason for this procedure from the list below. All of these items fulfill various CMS LCD requirements except the new and changing color options.
Detail Level: Detailed
Treatment Number (Will Not Render If 0): 0
Consent: The patient's consent was obtained including but not limited to risks of crusting, scabbing, blistering, scarring, darker or lighter pigmentary change, recurrence, incomplete removal and infection.
Medical Necessity Clause: This procedure was medically necessary because the lesions that were treated were:

## 2024-07-09 NOTE — PROCEDURE: ADDITIONAL NOTES
Render Risk Assessment In Note?: no
Detail Level: Simple
Additional Notes: Continues to return post LN2, elected curettage today

## 2024-09-24 ENCOUNTER — OFFICE VISIT (OUTPATIENT)
Dept: MEDICAL GROUP | Facility: LAB | Age: 44
End: 2024-09-24
Payer: COMMERCIAL

## 2024-09-24 VITALS
DIASTOLIC BLOOD PRESSURE: 62 MMHG | OXYGEN SATURATION: 97 % | TEMPERATURE: 97.9 F | HEART RATE: 88 BPM | BODY MASS INDEX: 44.56 KG/M2 | SYSTOLIC BLOOD PRESSURE: 112 MMHG | RESPIRATION RATE: 20 BRPM | WEIGHT: 261 LBS | HEIGHT: 64 IN

## 2024-09-24 DIAGNOSIS — M79.622 AXILLARY PAIN, LEFT: ICD-10-CM

## 2024-09-24 DIAGNOSIS — N64.4 BREAST TENDERNESS IN FEMALE: ICD-10-CM

## 2024-09-24 PROCEDURE — 3074F SYST BP LT 130 MM HG: CPT | Performed by: STUDENT IN AN ORGANIZED HEALTH CARE EDUCATION/TRAINING PROGRAM

## 2024-09-24 PROCEDURE — 3078F DIAST BP <80 MM HG: CPT | Performed by: STUDENT IN AN ORGANIZED HEALTH CARE EDUCATION/TRAINING PROGRAM

## 2024-09-24 PROCEDURE — 99213 OFFICE O/P EST LOW 20 MIN: CPT | Performed by: STUDENT IN AN ORGANIZED HEALTH CARE EDUCATION/TRAINING PROGRAM

## 2024-09-24 ASSESSMENT — ENCOUNTER SYMPTOMS
CHILLS: 0
DIARRHEA: 0
ABDOMINAL PAIN: 0
SHORTNESS OF BREATH: 0
FEVER: 0
VOMITING: 0
WEIGHT LOSS: 1
COUGH: 0
NAUSEA: 0

## 2024-09-24 ASSESSMENT — PATIENT HEALTH QUESTIONNAIRE - PHQ9: CLINICAL INTERPRETATION OF PHQ2 SCORE: 0

## 2024-09-24 ASSESSMENT — FIBROSIS 4 INDEX: FIB4 SCORE: 0.75

## 2024-09-24 NOTE — PROGRESS NOTES
"Verbal consent was acquired by the patient to use [employer] ambient listening note generation during this visit Yes.    Subjective:     Chief Complaint   Patient presents with    Other     Arm pit soreness     HPI:   Jennifer is a 44 y.o. female who presents today with chronic left armpit soreness. PCP unavailable.    History of Present Illness  The patient is a 44-year-old female who presents for evaluation of armpit soreness.    She has been experiencing persistent soreness in her left armpit for the past year. Despite not wearing a bra during the summer, the discomfort persisted. She recalls a particularly painful incident when she touched a spot in her armpit. She also mentions a pea-sized lump that she felt last night but was unable to locate again. She denies any soreness in her breasts.     Does report some recent fatigue and weight loss in relation to diet changes.    FAMILY HISTORY  Her aunt had breast cancer twice.    Review of Systems   Constitutional:  Positive for malaise/fatigue and weight loss. Negative for chills and fever.   Respiratory:  Negative for cough and shortness of breath.    Cardiovascular:  Negative for chest pain.   Gastrointestinal:  Negative for abdominal pain, diarrhea, nausea and vomiting.   Skin:  Negative for rash.       Objective:     Exam:  /62 (BP Location: Right arm, Patient Position: Sitting, BP Cuff Size: Adult long)   Pulse 88   Temp 36.6 °C (97.9 °F) (Temporal)   Resp 20   Ht 1.626 m (5' 4\")   Wt 118 kg (261 lb)   SpO2 97%   BMI 44.80 kg/m²  Body mass index is 44.8 kg/m².    Physical Exam  Vitals reviewed. Chaperone present: Declined chaperone.   Constitutional:       General: She is not in acute distress.     Appearance: Normal appearance. She is not ill-appearing.   HENT:      Head: Normocephalic and atraumatic.      Nose: Nose normal.      Mouth/Throat:      Mouth: Mucous membranes are moist.   Pulmonary:      Effort: Pulmonary effort is normal.   Chest: "   Breasts:     Right: No swelling, inverted nipple, mass, nipple discharge or tenderness.      Left: Tenderness (Generalized mild tenderness of the left breast and left axilla without focal findings) present. No swelling, inverted nipple, mass or nipple discharge.      Comments: Axillary breast tissue present, larger on the left compared to right without focal mass.  Lymphadenopathy:      Upper Body:      Right upper body: No supraclavicular, axillary or pectoral adenopathy.      Left upper body: No supraclavicular, axillary or pectoral adenopathy.   Skin:     General: Skin is warm and dry.      Findings: No rash.   Neurological:      General: No focal deficit present.      Mental Status: She is alert and oriented to person, place, and time.   Psychiatric:         Mood and Affect: Mood normal.         Behavior: Behavior normal.         Thought Content: Thought content normal.       Assessment & Plan:     44 y.o. female with the following -     1. Axillary pain, left  2. Breast tenderness in female  Chronic, evaluate further with imaging as below.  Plan pending results.  - MA DIAGNOSTIC MAMMO BILAT W/CAD; Future  - US-BREAST LIMITED-LEFT; Future    Return if symptoms worsen or fail to improve.    Please note that this dictation was created using voice recognition software. I have made every reasonable attempt to correct obvious errors, but I expect that there are errors of grammar and possibly content that I did not discover before finalizing the note.

## 2024-11-15 ENCOUNTER — OFFICE VISIT (OUTPATIENT)
Dept: URGENT CARE | Facility: CLINIC | Age: 44
End: 2024-11-15
Payer: COMMERCIAL

## 2024-11-15 VITALS
BODY MASS INDEX: 42 KG/M2 | DIASTOLIC BLOOD PRESSURE: 70 MMHG | OXYGEN SATURATION: 95 % | TEMPERATURE: 97.4 F | WEIGHT: 246 LBS | HEART RATE: 73 BPM | HEIGHT: 64 IN | SYSTOLIC BLOOD PRESSURE: 110 MMHG | RESPIRATION RATE: 12 BRPM

## 2024-11-15 DIAGNOSIS — R10.9 ABDOMINAL CRAMPING: ICD-10-CM

## 2024-11-15 DIAGNOSIS — K59.00 CONSTIPATION, UNSPECIFIED CONSTIPATION TYPE: ICD-10-CM

## 2024-11-15 LAB
APPEARANCE UR: CLEAR
BILIRUB UR STRIP-MCNC: NEGATIVE MG/DL
COLOR UR AUTO: YELLOW
GLUCOSE UR STRIP.AUTO-MCNC: NEGATIVE MG/DL
KETONES UR STRIP.AUTO-MCNC: 15 MG/DL
LEUKOCYTE ESTERASE UR QL STRIP.AUTO: NEGATIVE
NITRITE UR QL STRIP.AUTO: NEGATIVE
PH UR STRIP.AUTO: 5.5 [PH] (ref 5–8)
PROT UR QL STRIP: NEGATIVE MG/DL
RBC UR QL AUTO: NEGATIVE
SP GR UR STRIP.AUTO: 1.01
UROBILINOGEN UR STRIP-MCNC: 0.2 MG/DL

## 2024-11-15 PROCEDURE — 3078F DIAST BP <80 MM HG: CPT | Performed by: PHYSICIAN ASSISTANT

## 2024-11-15 PROCEDURE — 3074F SYST BP LT 130 MM HG: CPT | Performed by: PHYSICIAN ASSISTANT

## 2024-11-15 PROCEDURE — 99213 OFFICE O/P EST LOW 20 MIN: CPT | Performed by: PHYSICIAN ASSISTANT

## 2024-11-15 PROCEDURE — 81002 URINALYSIS NONAUTO W/O SCOPE: CPT | Performed by: PHYSICIAN ASSISTANT

## 2024-11-15 ASSESSMENT — FIBROSIS 4 INDEX: FIB4 SCORE: 0.75

## 2024-11-16 NOTE — PROGRESS NOTES
"Subjective:   Jennifer Cano is a 44 y.o. female who presents for Cramping and UTI (X 1 day )      HPI  The patient presents to the Urgent Care with complaints of abdominal cramping for about a day.  She had the stomach flu week ago with some nausea but no vomiting or diarrhea.  She had some constipation.  She started to take more fiber and had a good bowel movement about 3 to 4 days ago.  Hard chunks.  Recently had some lower abdominal cramping.  She is still having gas.  She had a small bowel movement yesterday.  She had some concern for possible UTI due to history of UTI without urinary symptoms. Denies any fever, chills, vomiting, diarrhea, bloody or black stools, dysuria, urinary urgency, urinary frequency. Recently changed diet. Veggies and meat.  Tolerating fluids well.  She has a normal appetite.  Has had a hysterectomy          Past Medical History:   Diagnosis Date    ASTHMA 6/1/2009    Class 1 obesity in adult 2/1/2019    Dyslipidemia 1/22/2019    Migraines, neuralgic 6/1/2009    Osteopenia 2/8/2016     Allergies   Allergen Reactions    Neomycin-Polymyxin B Rash     Rash, decreased healing    Other Misc Hives and Rash     Beer/champagne        Objective:     /70   Pulse 73   Temp 36.3 °C (97.4 °F) (Temporal)   Resp 12   Ht 1.626 m (5' 4\")   Wt 112 kg (246 lb)   SpO2 95%   BMI 42.23 kg/m²     Physical Exam  Vitals reviewed.   Constitutional:       General: She is not in acute distress.     Appearance: Normal appearance. She is not ill-appearing or toxic-appearing.   Eyes:      Conjunctiva/sclera: Conjunctivae normal.   Cardiovascular:      Rate and Rhythm: Normal rate.   Pulmonary:      Effort: Pulmonary effort is normal.   Abdominal:      General: Abdomen is flat. Bowel sounds are increased.      Palpations: Abdomen is soft. There is no hepatomegaly, splenomegaly or mass.      Tenderness: There is no abdominal tenderness. There is no guarding or rebound. Negative signs include " McBurney's sign.   Musculoskeletal:      Cervical back: Neck supple.   Skin:     General: Skin is warm and dry.   Neurological:      General: No focal deficit present.      Mental Status: She is alert and oriented to person, place, and time.   Psychiatric:         Mood and Affect: Mood normal.         Behavior: Behavior normal.       Results for orders placed or performed in visit on 11/15/24   POCT Urinalysis    Collection Time: 11/15/24  6:14 PM   Result Value Ref Range    POC Color Yellow Negative    POC Appearance Clear Negative    POC Glucose Negative Negative mg/dL    POC Bilirubin Negative Negative mg/dL    POC Ketones 15 Negative mg/dL    POC Specific Gravity 1.010 <1.005 - >1.030    POC Blood Negative Negative    POC Urine PH 5.5 5.0 - 8.0    POC Protein Negative Negative mg/dL    POC Urobiligen 0.2 Negative (0.2) mg/dL    POC Nitrites Negative Negative    POC Leukocyte Esterase Negative Negative       Diagnosis and associated orders:     1. Abdominal cramping  - POCT Urinalysis    2. Constipation, unspecified constipation type       Comments/MDM:     Patient reassured no signs of UTI.  Likely constipation and from recent diet changes.  The patient is very well-appearing in no acute distress.  Normal vital signs.  I was unable to elicit any significant abdominal tenderness on exam.  Tolerating fluids well.  Normal appetite.  Recommend symptomatic and supportive care at this time as well as close monitoring.  Increase fluid intake, increase fiber intake, docusate stool softener daily. May try MiraLax if no normal BM in the next day or so. Patient understands to return to the urgent care if no improvement of symptoms or follow-up with PCP.  She understands to present immediately to the ER if any worsening or severe abdominal pain or any other severe concerns.       I personally reviewed prior external notes and test results pertinent to today's visit. Pathogenesis of diagnosis discussed including typical  length and natural progression. Supportive care, natural history, differential diagnoses, and indications for immediate follow-up discussed. Patient expresses understanding and agrees to plan. Patient denies any other questions or concerns.     Follow-up with the primary care physician for recheck, reevaluation, and consideration of further management.    Please note that this dictation was created using voice recognition software. I have made a reasonable attempt to correct obvious errors, but I expect that there are errors of grammar and possibly content that I did not discover before finalizing the note.    This note was electronically signed by Jd Alexander PA-C

## 2025-03-03 ENCOUNTER — HOSPITAL ENCOUNTER (OUTPATIENT)
Dept: RADIOLOGY | Facility: MEDICAL CENTER | Age: 45
End: 2025-03-03
Payer: COMMERCIAL

## 2025-03-17 ENCOUNTER — HOSPITAL ENCOUNTER (OUTPATIENT)
Dept: RADIOLOGY | Facility: MEDICAL CENTER | Age: 45
End: 2025-03-17
Attending: STUDENT IN AN ORGANIZED HEALTH CARE EDUCATION/TRAINING PROGRAM
Payer: COMMERCIAL

## 2025-03-17 DIAGNOSIS — N64.4 BREAST TENDERNESS IN FEMALE: ICD-10-CM

## 2025-03-17 DIAGNOSIS — M79.622 AXILLARY PAIN, LEFT: ICD-10-CM

## 2025-03-17 PROCEDURE — 76642 ULTRASOUND BREAST LIMITED: CPT | Mod: RT

## 2025-03-17 PROCEDURE — G0279 TOMOSYNTHESIS, MAMMO: HCPCS

## 2025-03-18 ENCOUNTER — RESULTS FOLLOW-UP (OUTPATIENT)
Dept: MEDICAL GROUP | Facility: LAB | Age: 45
End: 2025-03-18

## 2025-03-21 ENCOUNTER — HOSPITAL ENCOUNTER (OUTPATIENT)
Dept: LAB | Facility: MEDICAL CENTER | Age: 45
End: 2025-03-21
Attending: FAMILY MEDICINE
Payer: COMMERCIAL

## 2025-03-21 ENCOUNTER — OFFICE VISIT (OUTPATIENT)
Dept: MEDICAL GROUP | Facility: LAB | Age: 45
End: 2025-03-21
Payer: COMMERCIAL

## 2025-03-21 VITALS
HEART RATE: 70 BPM | RESPIRATION RATE: 16 BRPM | SYSTOLIC BLOOD PRESSURE: 122 MMHG | DIASTOLIC BLOOD PRESSURE: 70 MMHG | WEIGHT: 245 LBS | OXYGEN SATURATION: 97 % | TEMPERATURE: 97.1 F | BODY MASS INDEX: 41.83 KG/M2 | HEIGHT: 64 IN

## 2025-03-21 DIAGNOSIS — Z90.710 H/O: HYSTERECTOMY: ICD-10-CM

## 2025-03-21 DIAGNOSIS — Z84.89 FAMILY HISTORY OF UNEXPLAINED BLEEDING: ICD-10-CM

## 2025-03-21 DIAGNOSIS — M79.622 AXILLARY PAIN, LEFT: ICD-10-CM

## 2025-03-21 DIAGNOSIS — R53.83 OTHER FATIGUE: ICD-10-CM

## 2025-03-21 DIAGNOSIS — R76.8 POSITIVE ANA (ANTINUCLEAR ANTIBODY): ICD-10-CM

## 2025-03-21 DIAGNOSIS — M89.8X9 BONE PAIN: ICD-10-CM

## 2025-03-21 DIAGNOSIS — N76.0 VAGINOSIS: ICD-10-CM

## 2025-03-21 LAB
BASOPHILS # BLD AUTO: 0.4 % (ref 0–1.8)
BASOPHILS # BLD: 0.03 K/UL (ref 0–0.12)
EOSINOPHIL # BLD AUTO: 0.15 K/UL (ref 0–0.51)
EOSINOPHIL NFR BLD: 1.9 % (ref 0–6.9)
ERYTHROCYTE [DISTWIDTH] IN BLOOD BY AUTOMATED COUNT: 42.5 FL (ref 35.9–50)
FERRITIN SERPL-MCNC: 84.1 NG/ML (ref 10–291)
FSH SERPL-ACNC: 13 MIU/ML
HCT VFR BLD AUTO: 42.2 % (ref 37–47)
HGB BLD-MCNC: 13.8 G/DL (ref 12–16)
IMM GRANULOCYTES # BLD AUTO: 0.03 K/UL (ref 0–0.11)
IMM GRANULOCYTES NFR BLD AUTO: 0.4 % (ref 0–0.9)
IRON SATN MFR SERPL: 22 % (ref 15–55)
IRON SERPL-MCNC: 73 UG/DL (ref 40–170)
LYMPHOCYTES # BLD AUTO: 2.53 K/UL (ref 1–4.8)
LYMPHOCYTES NFR BLD: 31.7 % (ref 22–41)
MCH RBC QN AUTO: 29.5 PG (ref 27–33)
MCHC RBC AUTO-ENTMCNC: 32.7 G/DL (ref 32.2–35.5)
MCV RBC AUTO: 90.2 FL (ref 81.4–97.8)
MONOCYTES # BLD AUTO: 0.41 K/UL (ref 0–0.85)
MONOCYTES NFR BLD AUTO: 5.1 % (ref 0–13.4)
NEUTROPHILS # BLD AUTO: 4.84 K/UL (ref 1.82–7.42)
NEUTROPHILS NFR BLD: 60.5 % (ref 44–72)
NRBC # BLD AUTO: 0 K/UL
NRBC BLD-RTO: 0 /100 WBC (ref 0–0.2)
PLATELET # BLD AUTO: 262 K/UL (ref 164–446)
PMV BLD AUTO: 10 FL (ref 9–12.9)
RBC # BLD AUTO: 4.68 M/UL (ref 4.2–5.4)
TIBC SERPL-MCNC: 325 UG/DL (ref 250–450)
TSH SERPL DL<=0.005 MIU/L-ACNC: 1.98 UIU/ML (ref 0.38–5.33)
UIBC SERPL-MCNC: 252 UG/DL (ref 110–370)
WBC # BLD AUTO: 8 K/UL (ref 4.8–10.8)

## 2025-03-21 PROCEDURE — 99214 OFFICE O/P EST MOD 30 MIN: CPT | Performed by: FAMILY MEDICINE

## 2025-03-21 PROCEDURE — 83540 ASSAY OF IRON: CPT

## 2025-03-21 PROCEDURE — 85240 CLOT FACTOR VIII AHG 1 STAGE: CPT

## 2025-03-21 PROCEDURE — 3074F SYST BP LT 130 MM HG: CPT | Performed by: FAMILY MEDICINE

## 2025-03-21 PROCEDURE — 85025 COMPLETE CBC W/AUTO DIFF WBC: CPT

## 2025-03-21 PROCEDURE — 3078F DIAST BP <80 MM HG: CPT | Performed by: FAMILY MEDICINE

## 2025-03-21 PROCEDURE — 86038 ANTINUCLEAR ANTIBODIES: CPT

## 2025-03-21 PROCEDURE — 85245 CLOT FACTOR VIII VW RISTOCTN: CPT

## 2025-03-21 PROCEDURE — 83550 IRON BINDING TEST: CPT

## 2025-03-21 PROCEDURE — 82728 ASSAY OF FERRITIN: CPT

## 2025-03-21 PROCEDURE — 36415 COLL VENOUS BLD VENIPUNCTURE: CPT

## 2025-03-21 PROCEDURE — 84443 ASSAY THYROID STIM HORMONE: CPT

## 2025-03-21 PROCEDURE — 83001 ASSAY OF GONADOTROPIN (FSH): CPT

## 2025-03-21 PROCEDURE — 85246 CLOT FACTOR VIII VW ANTIGEN: CPT

## 2025-03-21 PROCEDURE — 86235 NUCLEAR ANTIGEN ANTIBODY: CPT | Mod: 91

## 2025-03-21 PROCEDURE — 86039 ANTINUCLEAR ANTIBODIES (ANA): CPT

## 2025-03-21 PROCEDURE — 86225 DNA ANTIBODY NATIVE: CPT

## 2025-03-21 ASSESSMENT — FIBROSIS 4 INDEX: FIB4 SCORE: 0.75

## 2025-03-21 NOTE — PROGRESS NOTES
"Subjective:     Chief Complaint   Patient presents with    Results         HPI:   Jennifer presents today for results from Mammo and US. She had complaints of breast tenderness and axillary pain late last September. Imaging normal.     FINDINGS:  The breasts are heterogeneously dense, which may obscure small masses.     Mildly prominent asymmetric right axillary lymph nodes.     No suspicious masses or microcalcifications. No skin thickening or nipple retraction.     Targeted ultrasound did not show any suspicious solid or cystic masses to correlate with the mammogram. Bilateral axillae appear unremarkable. No suspicious solid or cystic masses correlating with patient's area of palpable concern.     This all initially started last year with some abnormal labs, more pain complaints, bone pain , deep aching. TAMARA elevated but then reflex was all negative. No elevated inflammatory markers.   Continues with these aches and pains.     Hysterectomy for heavy bleeding in the past. 2023.  Pain complaints really started after her hysterectomy.  Reporting ob/gyn told her her hormones were fine.     Son told recently he had von willebrands.     Does report sleeping well.  Restless in the second half of the night due to pains.   Not working out, increased pain after exercise, so she stopped that.         No current [facility]-ordered outpatient medications on file.     No current [facility]-ordered facility-administered medications on file.         ROS:  Gen: no fevers/chills, no changes in weight  Eyes: no changes in vision  ENT: no sore throat, no hearing loss, no bloody nose  Pulm: no sob, no cough  CV: no chest pain, no palpitations  GI: no nausea/vomiting, no diarrhea  : no dysuria  MSk: no myalgias  Skin: no rash  Neuro: no headaches, no numbness/tingling  Heme/Lymph: no easy bruising      Objective:     Exam:  /70   Pulse 70   Temp 36.2 °C (97.1 °F)   Resp 16   Ht 1.626 m (5' 4\")   Wt 111 kg (245 lb)   SpO2 97%   BMI 42.05 " kg/m²  Body mass index is 42.05 kg/m².    Gen: AAOx3, NAD, well appearing  HEENT: NCAT, EOMI, Nares patent, Mucosa moist  Resp: Normal chest wall rise and fall, not SOB, no tachypnea  Skin: no rash or abnormality of visible skin.   Psych: normal speech, not slurred, good insight, affect full  MSK: Moves all four limbs equally and normally, gait normal      Assessment & Plan:     44 y.o. female with the following -   1. Axillary pain, left  Discussed normal imaging.  Does not seem that the pain that she is experiencing in the axilla are related to lymph nodes.  In fact her her complaints are mostly on the left side and the right side was where there was some marginally Ellah dated lymph nodes.  This is pretty nonspecific so we will go ahead and get some updated labs for further risk assessment.  We did discuss the significance of having hysterectomy even though she retains her ovaries and a lot of the symptoms starting.  We did discuss that her is not always harm and just because she is sore after working out it does not mean that she can stop exercise.  That would still be mostly beneficial for her health in general.  Will get some updated labs for further risk assessment and follow-up back in clinic to discuss some options for treatment if we can find some kind of thing to target.    2. Vaginosis      3. Family history of unexplained bleeding    - VON WILLEBRAND'S PROFILE; Future    4. Positive TAMARA (antinuclear antibody)    - CBC WITH DIFFERENTIAL; Future  - TAMARA REFLEXIVE PROFILE; Future    5. Bone pain    - TSH WITH REFLEX TO FT4; Future  - IRON/TOTAL IRON BIND; Future  - FERRITIN; Future  - FSH; Future    6. H/O: hysterectomy    - FSH; Future    7. Other fatigue  - IRON/TOTAL IRON BIND; Future  - FERRITIN; Future            No follow-ups on file.    Please note that this dictation was created using voice recognition software. I have made every reasonable attempt to correct obvious errors, but I expect that there  are errors of grammar and possibly content that I did not discover before finalizing the note.

## 2025-03-23 LAB — NUCLEAR IGG SER QL IA: DETECTED

## 2025-03-24 ENCOUNTER — RESULTS FOLLOW-UP (OUTPATIENT)
Dept: MEDICAL GROUP | Facility: LAB | Age: 45
End: 2025-03-24
Payer: COMMERCIAL

## 2025-03-24 DIAGNOSIS — Z71.9 ENCOUNTER FOR CONSULTATION: ICD-10-CM

## 2025-03-24 LAB
ANA PAT SER IF-IMP: ABNORMAL
ANA PAT SER IF-IMP: ABNORMAL
NUCLEAR IGG SER QL IF: DETECTED
NUCLEAR IGG TITR SER IF: ABNORMAL {TITER}

## 2025-03-25 LAB
DSDNA AB TITR SER CLIF: NORMAL {TITER}
ENA JO1 AB TITR SER: 1 AU/ML (ref 0–40)
ENA SCL70 IGG SER QL: 1 AU/ML (ref 0–40)
ENA SM IGG SER-ACNC: 4 AU/ML (ref 0–40)
ENA SS-A 60KD AB SER-ACNC: 3 AU/ML (ref 0–40)
ENA SS-A IGG SER QL: 1 AU/ML (ref 0–40)
ENA SS-B IGG SER IA-ACNC: 0 AU/ML (ref 0–40)

## 2025-03-26 LAB
FACT VIII ACT/NOR PPP: 72 % (ref 56–191)
U1 SNRNP IGG SER QL: 8 UNITS (ref 0–19)
VWF AG ACT/NOR PPP IA: 74 % (ref 52–214)
VWF:RCO ACT/NOR PPP PL AGG: 81 % (ref 51–215)

## 2025-03-29 ENCOUNTER — E-CONSULT (OUTPATIENT)
Dept: RHEUMATOLOGY | Facility: MEDICAL CENTER | Age: 45
End: 2025-03-29
Payer: COMMERCIAL

## 2025-03-29 DIAGNOSIS — R76.8 SEROLOGIC AUTOIMMUNITY: ICD-10-CM

## 2025-03-29 DIAGNOSIS — Z71.9 ENCOUNTER FOR CONSULTATION: ICD-10-CM

## 2025-03-29 DIAGNOSIS — R53.83 FATIGUE, UNSPECIFIED TYPE: ICD-10-CM

## 2025-03-29 NOTE — PROGRESS NOTES
"    [facility] RHEUMATOLOGY  [address], [address], [place] [address]  Phone: [phone] ? Fax: [phone]  [facility]/Health-Services/Rheumatology    E-CONSULT OUTPATIENT NOTE         Subjective     DATE OF SERVICE: 03/29/2025    REQUESTING PRACTITIONER:  Maribell Young M.D.  [address]  [place] [address]    PATIENT IDENTIFICATION:  Jennifer Cano  [address] [place] [address]    YOB: 1980    MEDICAL RECORD NUMBER: [id number]         REASON FOR E-CONSULT:   My specific clinical question: \"Is TAMARA concerning. Please assess and respond with your recommendations regarding interpretation of clinical findings.\"      SUMMARY OF DATA REVIEWED:  Jennifer Cano is a 44 y.o. female with pertinent clinical presentation as noted above. Relevant clinical notes and patient's medical history, surgical history, social history, family history, medications, allergies, immunizations, laboratory results, and radiology results as outlined below have all been extensively reviewed and interpreted by me in the context of the clinical question regarding the patient's presentation and diagnostic investigations.    Pertinent positive labs: Positive TAMARA 1:160 homogenous pattern with negative reflex panel (in 3/2025<5/2024).    Pertinent negative labs: Negative RF, CRP, ESR, TSH, FT4, CBC, eGFR, creatinine, and LFTs (in 5/2024), von Willebrand's profile, ferritin and iron panel (in 3/2025).      CURRENT PROBLEM LIST:  Patient Active Problem List    Diagnosis Date Noted    Serologic autoimmunity (positive TAMARA 1:160 homogenous) 03/29/2025    Fatigue 03/29/2025    Axillary pain, left 09/24/2024    Class 1 obesity in adult 02/01/2019    Dyslipidemia 01/22/2019    Obesity (BMI 35.0-39.9 without comorbidity) (Formerly [facility]) 01/06/2018    Osteopenia 02/08/2016    Migraines, neuralgic 06/01/2009    Asthma, well controlled 06/01/2009       PAST MEDICAL HISTORY:  Past Medical History:   Diagnosis " Date    ASTHMA 6/1/2009    Class 1 obesity in adult 2/1/2019    Dyslipidemia 1/22/2019    Migraines, neuralgic 6/1/2009    Osteopenia 2/8/2016       PAST SURGICAL HISTORY:  Past Surgical History:   Procedure Laterality Date    HYSTERECTOMY LAPAROSCOPY  6/20/2023    Procedure: LAPARASCOPICALLY ASSISTED VAGINAL HYSTERECTOMY, BLADDER CYSTOSCOPY;  Surgeon: Luigi Cano M.D.;  Location: SURGERY SAME DAY [facility];  Service: Gynecology    PRIMARY C SECTION  05/26/2023    times 2, tubal ligation with last c section    OTHER ORTHOPEDIC SURGERY Right 05/26/2023    index finger repair 2004       SOCIAL HISTORY:   Social History     Socioeconomic History    Marital status:      Spouse name: Not on file    Number of children: Not on file    Years of education: Not on file    Highest education level: Not on file   Occupational History    Not on file   Tobacco Use    Smoking status: Never    Smokeless tobacco: Never   Vaping Use    Vaping status: Never Used   Substance and Sexual Activity    Alcohol use: Yes     Comment: 2 times weekly    Drug use: Yes     Types: Marijuana, Inhaled     Comment: at [facility]    Sexual activity: Not Currently   Other Topics Concern    Not on file   Social History Narrative    Not on file     Social Drivers of Health     Financial Resource Strain: Not on file   Food Insecurity: Not on file   Transportation Needs: Not on file   Physical Activity: Not on file   Stress: Not on file   Social Connections: Not on file   Intimate Partner Violence: Not on file   Housing Stability: Not on file       FAMILY HISTORY:  Family History   Problem Relation Age of Onset    Hypertension Mother        MEDICATIONS:  No current outpatient medications on file.       ALLERGIES:   Allergies   Allergen Reactions    Neomycin-Polymyxin B Rash     Rash, decreased healing    Other Misc Hives and Rash     Beer/champagne       IMMUNIZATIONS:  Immunization History   Administered Date(s) Administered    INFLUENZA TIV (IM)  10/15/2015    Influenza Seasonal Injectable - Historical Data 11/17/2014, 10/15/2015    Influenza Vaccine Quad Inj (Pf) 11/04/2013, 09/19/2015, 10/15/2016, 09/16/2017, 11/08/2018, 10/22/2019, 09/13/2020, 10/16/2021    Tdap Vaccine 08/29/2013, 07/25/2022            Objective     LABORATORY RESULTS REVIEWED AND INTERPRETED:  Lab Results   Component Value Date/Time    CREACTPROT <0.30 05/16/2024 08:38 AM    SEDRATEWES 11 05/16/2024 08:38 AM    URICACID 4.9 09/03/2008 10:20 AM     Lab Results   Component Value Date/Time    RHEUMFACTN 11 05/16/2024 08:38 AM     Lab Results   Component Value Date/Time    ANTINUCAB Detected (A) 03/21/2025 11:25 AM     Lab Results   Component Value Date/Time    ANTIDNADS SEE BELOW 03/21/2025 11:25 AM    SMITHAB 4 03/21/2025 11:25 AM    RNPAB 8 03/21/2025 11:25 AM    CSVGRUA91 1 03/21/2025 11:25 AM    SSA60 3 03/21/2025 11:25 AM    SSA52 1 03/21/2025 11:25 AM    ANTISSBSJ 0 03/21/2025 11:25 AM    JO1AB 1 03/21/2025 11:25 AM     Lab Results   Component Value Date/Time    TSHULTRASEN 1.980 03/21/2025 11:25 AM    FREET4 1.00 05/16/2024 08:38 AM     Lab Results   Component Value Date/Time    FERRITIN 84.1 03/21/2025 11:25 AM    IRON 73 03/21/2025 11:25 AM     Lab Results   Component Value Date/Time    WBC 8.0 03/21/2025 11:25 AM    RBC 4.68 03/21/2025 11:25 AM    HEMOGLOBIN 13.8 03/21/2025 11:25 AM    HEMATOCRIT 42.2 03/21/2025 11:25 AM    MCV 90.2 03/21/2025 11:25 AM    MCH 29.5 03/21/2025 11:25 AM    MCHC 32.7 03/21/2025 11:25 AM    RDW 42.5 03/21/2025 11:25 AM    PLATELETCT 262 03/21/2025 11:25 AM    MPV 10.0 03/21/2025 11:25 AM    NEUTS 4.84 03/21/2025 11:25 AM    LYMPHOCYTES 31.70 03/21/2025 11:25 AM    MONOCYTES 5.10 03/21/2025 11:25 AM    EOSINOPHILS 1.90 03/21/2025 11:25 AM    BASOPHILS 0.40 03/21/2025 11:25 AM     Lab Results   Component Value Date/Time    ASTSGOT 22 05/16/2024 08:38 AM    ALTSGPT 26 05/16/2024 08:38 AM    ALKPHOSPHAT 91 05/16/2024 08:38 AM    TBILIRUBIN 0.5 05/16/2024  08:38 AM    TOTPROTEIN 7.1 05/16/2024 08:38 AM    ALBUMIN 4.2 05/16/2024 08:38 AM     Lab Results   Component Value Date/Time    SODIUM 137 05/16/2024 08:38 AM    POTASSIUM 3.8 05/16/2024 08:38 AM    CHLORIDE 102 05/16/2024 08:38 AM    CO2 23 05/16/2024 08:38 AM    GLUCOSE 100 (H) 05/16/2024 08:38 AM    BUN 10 05/16/2024 08:38 AM    CREATININE 0.65 05/16/2024 08:38 AM    CREATININE 0.7 09/03/2008 10:20 AM    CALCIUM 9.3 05/16/2024 08:38 AM     Lab Results   Component Value Date/Time    COLORURINE Yellow 05/16/2024 08:39 AM    SPECGRAVITY 1.004 05/16/2024 08:39 AM    PHURINE 6.5 05/16/2024 08:39 AM    GLUCOSEUR Negative 05/16/2024 08:39 AM    KETONES Negative 05/16/2024 08:39 AM    PROTEINURIN Negative 05/16/2024 08:39 AM     Lab Results   Component Value Date/Time    CHOLSTRLTOT 236 (H) 01/23/2021 09:25 AM     (H) 01/23/2021 09:25 AM    HDL 38 (A) 01/23/2021 09:25 AM    TRIGLYCERIDE 103 01/23/2021 09:25 AM    HBA1C 5.9 (H) 01/23/2021 09:25 AM       RADIOLOGY RESULTS REVIEWED AND INTERPRETED:  No relevant loadable results found in the system. Pertinent non-system results, if applicable, summarized under history above.      All relevant laboratory and imaging results reported on this note were reviewed and interpreted by me.         Assessment & Plan     Jennifer Cano is a 44 y.o. female with history as noted above. Relevant clinical notes and patient's medical history, surgical history, social history, family history, medications, allergies, immunizations, laboratory results, and imaging results as outlined above have all been extensively reviewed and interpreted by me in the context of the clinical question regarding the patient's presentation and diagnostic investigations. Clinical picture is consistent with the following impressions, hence the following recommendations.      IMPRESSIONS:  1. 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 Fatigue,  unspecified type      RECOMMENDATIONS:  Check dsDNA, histone, and chromatin antibodies for risk stratification  Notify rheumatology if any of the above comes back positive      E-Consult Time: 15 minutes were spent on this encounter including extensive chart review for data acquisition and interpretation, electronic communication with the referring practitioner, and care coordination (Code [id number]).           Thank you for the opportunity to participate in the care of Jennifer Cano.    Jackson Lau MD, MS, FACR  Rheumatologist, [facility] Rheumatology, [facility]   of Clinical Medicine, Department of Internal Medicine  [facility] [employer]

## 2025-03-31 DIAGNOSIS — R76.8 POSITIVE ANA (ANTINUCLEAR ANTIBODY): ICD-10-CM

## 2025-04-18 ENCOUNTER — OFFICE VISIT (OUTPATIENT)
Dept: MEDICAL GROUP | Facility: LAB | Age: 45
End: 2025-04-18
Payer: COMMERCIAL

## 2025-04-18 ENCOUNTER — HOSPITAL ENCOUNTER (OUTPATIENT)
Dept: LAB | Facility: MEDICAL CENTER | Age: 45
End: 2025-04-18
Attending: FAMILY MEDICINE
Payer: COMMERCIAL

## 2025-04-18 VITALS
BODY MASS INDEX: 41.15 KG/M2 | DIASTOLIC BLOOD PRESSURE: 70 MMHG | WEIGHT: 241 LBS | SYSTOLIC BLOOD PRESSURE: 112 MMHG | OXYGEN SATURATION: 97 % | HEIGHT: 64 IN | HEART RATE: 80 BPM | RESPIRATION RATE: 16 BRPM | TEMPERATURE: 97.3 F

## 2025-04-18 DIAGNOSIS — R76.8 POSITIVE ANA (ANTINUCLEAR ANTIBODY): ICD-10-CM

## 2025-04-18 DIAGNOSIS — G89.29 OTHER CHRONIC PAIN: ICD-10-CM

## 2025-04-18 PROCEDURE — 3074F SYST BP LT 130 MM HG: CPT | Performed by: FAMILY MEDICINE

## 2025-04-18 PROCEDURE — 36415 COLL VENOUS BLD VENIPUNCTURE: CPT

## 2025-04-18 PROCEDURE — 83516 IMMUNOASSAY NONANTIBODY: CPT | Mod: 91

## 2025-04-18 PROCEDURE — 99213 OFFICE O/P EST LOW 20 MIN: CPT | Performed by: FAMILY MEDICINE

## 2025-04-18 PROCEDURE — 3078F DIAST BP <80 MM HG: CPT | Performed by: FAMILY MEDICINE

## 2025-04-18 ASSESSMENT — FIBROSIS 4 INDEX: FIB4 SCORE: 0.72

## 2025-04-18 NOTE — PROGRESS NOTES
"Subjective:     Chief Complaint   Patient presents with    Follow-Up         HPI:   Jennifer presents today for follow up.     She does continue to have some bone pain.  Certain things like cold air blowing on her skin seems to cause pain.    She did start more keto related diet. Thinks she might be feelig a bit better. Less discomfort/pain in general.     Positive TAMARA with a ratio of 1:1 60.  Reflex testing was then negative.  I did E consult rheumatology who advised some further labs to investigate.  These were ordered but Shaunna did not read her MyChart and so never got these done.  Prior history of hysterectomy.  Retains her ovaries.    No current Epic-ordered outpatient medications on file.     No current [employer]-ordered facility-administered medications on file.         ROS:  Gen: no fevers/chills, no changes in weight  Eyes: no changes in vision  ENT: no sore throat, no hearing loss, no bloody nose  Pulm: no sob, no cough  CV: no chest pain, no palpitations  GI: no nausea/vomiting, no diarrhea  : no dysuria  MSk: no myalgias  Skin: no rash  Neuro: no headaches, no numbness/tingling  Heme/Lymph: no easy bruising      Objective:     Exam:  /70   Pulse 80   Temp 36.3 °C (97.3 °F)   Resp 16   Ht 1.626 m (5' 4\")   Wt 109 kg (241 lb)   SpO2 97%   BMI 41.37 kg/m²  Body mass index is 41.37 kg/m².    Gen: Alert and oriented, No apparent distress.  Neck: Neck is supple without lymphadenopathy.  Lungs: Normal effort, CTA bilaterally, no wheezes, rhonchi, or rales  CV: Regular rate and rhythm. No murmurs, rubs, or gallops.  Ext: No clubbing, cyanosis, edema.      Assessment & Plan:     44 y.o. female with the following -     1. Other chronic pain  Discussed that a lot of this could be multifactorial.  There definitely could be a hormonal component but also could still be an autoimmune or inflammatory component.  She will get her labs drawn that were ordered previously.  We can consult rheumatology officially if " these are positive.  If negative intake is reasonable to consider adding some estrogen supplementation to see how she does with this.  We discussed different formulations with oral, transdermal, spray, vaginal ring, etc.  Discussed that with not having a uterus she does not need to have progesterone on board necessarily.  Let her know what her labs show via [employer] and then we can discuss any next options.  I think I would continue with her dietary changes depending upon how she does going forward we can slowly add in more activity and exercise as weight loss will be very helpful I think overall for her pain.            No follow-ups on file.    Please note that this dictation was created using voice recognition software. I have made every reasonable attempt to correct obvious errors, but I expect that there are errors of grammar and possibly content that I did not discover before finalizing the note.

## 2025-04-21 LAB — CHROMATIN IGG SERPL-ACNC: 4 UNITS (ref 0–19)

## 2025-04-22 ENCOUNTER — RESULTS FOLLOW-UP (OUTPATIENT)
Dept: MEDICAL GROUP | Facility: LAB | Age: 45
End: 2025-04-22
Payer: COMMERCIAL

## 2025-04-22 LAB — HISTONE IGG SER IA-ACNC: 0.4 UNITS (ref 0–0.9)

## 2025-06-18 ENCOUNTER — PATIENT MESSAGE (OUTPATIENT)
Dept: MEDICAL GROUP | Facility: LAB | Age: 45
End: 2025-06-18
Payer: COMMERCIAL

## 2025-06-24 ENCOUNTER — PATIENT MESSAGE (OUTPATIENT)
Dept: MEDICAL GROUP | Facility: LAB | Age: 45
End: 2025-06-24
Payer: COMMERCIAL

## 2025-06-24 DIAGNOSIS — Z79.890 HORMONE REPLACEMENT THERAPY (HRT): Primary | ICD-10-CM

## 2025-06-25 ENCOUNTER — APPOINTMENT (OUTPATIENT)
Dept: MEDICAL GROUP | Facility: LAB | Age: 45
End: 2025-06-25
Payer: COMMERCIAL

## 2025-07-01 NOTE — Clinical Note
REFERRAL APPROVAL NOTICE         Sent on July 1, 2025                   Jennifer Murrayr  [address] [place] [address]                   Dear Ms. Cano,    After a careful review of the medical information and benefit coverage, [facility] has processed your referral. See below for additional details.    If applicable, you must be actively enrolled with your insurance for coverage of the authorized service. If you have any questions regarding your coverage, please contact your insurance directly.    REFERRAL INFORMATION   Referral #:  [id number]  Referred-To Department    Referred-By Provider:  OB/Gyn    Jacob Ya D.O.   [facility]      [address]  [place] [id number] [phone] [address]  [place] [address] [phone]    Referral Start Date:  06/24/2025  Referral End Date:   06/24/2026             SCHEDULING  If you do not already have an appointment, please call [phone] to make an appointment.     MORE INFORMATION  If you do not already have a [employer] account, sign up at: [email or web address]  You can access your medical information, make appointments, see lab results, billing information, and more.  If you have questions regarding this referral, please contact  the [facility] Referrals department at:             [phone]. Monday - Friday 8:00AM - 5:00PM.     Sincerely,    [facility]

## (undated) DEVICE — SENSOR OXIMETER ADULT SPO2 RD SET (20EA/BX)

## (undated) DEVICE — SUTURE 1 6-18IN COATED VICRYL - PLUS VIO TIES(12PK/BX)

## (undated) DEVICE — TROCAR Z THREAD 11 X 100 - BLADED (6/BX)

## (undated) DEVICE — TRAY FOLEY CATHETER STATLOCK 16FR SURESTEP  (10EA/CA)

## (undated) DEVICE — CANNULA O2 COMFORT SOFT EAR ADULT 7 FT TUBING (50/CA)

## (undated) DEVICE — CANISTER SUCTION 3000ML MECHANICAL FILTER AUTO SHUTOFF MEDI-VAC NONSTERILE LF DISP  (40EA/CA)

## (undated) DEVICE — SET SUCTION/IRRIGATION WITH DISPOSABLE TIP (6/CA )PART #0250-070-520 IS A SUB

## (undated) DEVICE — TOWEL STOP TIMEOUT SAFETY FLAG (40EA/CA)

## (undated) DEVICE — LIGASURE TISSUE FUSION  - SINGLE USE (6/CA)

## (undated) DEVICE — Device

## (undated) DEVICE — SET LEADWIRE 5 LEAD BEDSIDE DISPOSABLE ECG (1SET OF 5/EA)

## (undated) DEVICE — DRESSING TRANSPARENT FILM TEGADERM 2.375 X 2.75"  (100EA/BX)"

## (undated) DEVICE — CANISTER SUCTION RIGID RED 1500CC (40EA/CA)

## (undated) DEVICE — MASK OXYGEN VNYL ADLT MED CONC WITH 7 FOOT TUBING  - (50EA/CA)

## (undated) DEVICE — DRAPE VAGINAL BIB W/ POUCH (10EA/CA)

## (undated) DEVICE — LACTATED RINGERS INJ 1000 ML - (14EA/CA 60CA/PF)

## (undated) DEVICE — GOWN WARMING STANDARD FLEX - (30/CA)

## (undated) DEVICE — GLOVE BIOGEL SZ 8 SURGICAL PF LTX - (50PR/BX 4BX/CA)

## (undated) DEVICE — TROCAR 5X100 BLADED Z-THREAD - KII (6/BX)

## (undated) DEVICE — SUCTION INSTRUMENT YANKAUER BULBOUS TIP W/O VENT (50EA/CA)

## (undated) DEVICE — KIT  I.V. START (100EA/CA)

## (undated) DEVICE — TUBE CONNECTING SUCTION - CLEAR PLASTIC STERILE 72 IN (50EA/CA)

## (undated) DEVICE — GLOVESZ 8.5 BIOGEL PI MICRO - PF LF (50PR/BX)

## (undated) DEVICE — WATER IRRIGATION STERILE 1000ML (12EA/CA)

## (undated) DEVICE — SUTURE 0 VICRYL PLUS CT-1 - 8 X 18 INCH (12/BX)

## (undated) DEVICE — SUTURE 0 VICRYL PLUS UR-6 - 27 INCH (36/BX)

## (undated) DEVICE — SPONGE GAUZESTER. 2X2 4-PL - (2/PK 50PK/BX 30BX/CS)

## (undated) DEVICE — CANNULA W/SEAL 5X100 Z-THRE - ADED KII (12/BX)

## (undated) DEVICE — SUTURE 4-0 MONOCRYL PLUS PS-2 - 27 INCH (36/BX)

## (undated) DEVICE — PAD SANITARY 11IN MAXI IND WRAPPED  (12EA/PK 24PK/CA)

## (undated) DEVICE — ENDO SHEARS LONG - (6EA/CA)

## (undated) DEVICE — SUTURE 0 VICRYL PLUS CT-1 - 36 INCH (36/BX)

## (undated) DEVICE — SLEEVE VASO CALF MED - (10PR/CA)

## (undated) DEVICE — PAD BABY LAP 4X18 W/O - RINGS PREWASHED 5/PK 40PK/CS

## (undated) DEVICE — NEEDLE INSFL 120MM 14GA VRRS - (20/BX)

## (undated) DEVICE — SODIUM CHL IRRIGATION 0.9% 1000ML (12EA/CA)

## (undated) DEVICE — TUBING CLEARLINK DUO-VENT - C-FLO (48EA/CA)

## (undated) DEVICE — SET IRRIGATION CYSTOSCOPY TUBE L80 IN (20EA/CA)